# Patient Record
Sex: FEMALE | Race: WHITE | Employment: UNEMPLOYED | URBAN - METROPOLITAN AREA
[De-identification: names, ages, dates, MRNs, and addresses within clinical notes are randomized per-mention and may not be internally consistent; named-entity substitution may affect disease eponyms.]

---

## 2018-01-30 ENCOUNTER — APPOINTMENT (OUTPATIENT)
Dept: GENERAL RADIOLOGY | Facility: CLINIC | Age: 73
End: 2018-01-30
Attending: STUDENT IN AN ORGANIZED HEALTH CARE EDUCATION/TRAINING PROGRAM
Payer: MEDICARE

## 2018-01-30 ENCOUNTER — APPOINTMENT (OUTPATIENT)
Dept: CT IMAGING | Facility: CLINIC | Age: 73
End: 2018-01-30
Attending: STUDENT IN AN ORGANIZED HEALTH CARE EDUCATION/TRAINING PROGRAM
Payer: MEDICARE

## 2018-01-30 ENCOUNTER — HOSPITAL ENCOUNTER (EMERGENCY)
Facility: CLINIC | Age: 73
Discharge: ANOTHER HOSPITAL | End: 2018-01-31
Attending: STUDENT IN AN ORGANIZED HEALTH CARE EDUCATION/TRAINING PROGRAM | Admitting: STUDENT IN AN ORGANIZED HEALTH CARE EDUCATION/TRAINING PROGRAM
Payer: MEDICARE

## 2018-01-30 DIAGNOSIS — A41.9 SEPSIS, DUE TO UNSPECIFIED ORGANISM: ICD-10-CM

## 2018-01-30 DIAGNOSIS — I48.91 ATRIAL FIBRILLATION, UNSPECIFIED TYPE (H): ICD-10-CM

## 2018-01-30 DIAGNOSIS — R41.89 UNRESPONSIVENESS: ICD-10-CM

## 2018-01-30 DIAGNOSIS — J18.9 PNEUMONIA OF RIGHT LOWER LOBE DUE TO INFECTIOUS ORGANISM: ICD-10-CM

## 2018-01-30 DIAGNOSIS — J96.02 ACUTE RESPIRATORY FAILURE WITH HYPERCAPNIA (H): ICD-10-CM

## 2018-01-30 LAB
ALBUMIN SERPL-MCNC: 3.6 G/DL (ref 3.4–5)
ALP SERPL-CCNC: 92 U/L (ref 40–150)
ALT SERPL W P-5'-P-CCNC: 29 U/L (ref 0–50)
ANION GAP SERPL CALCULATED.3IONS-SCNC: 10 MMOL/L (ref 3–14)
APAP SERPL-MCNC: <2 MG/L (ref 10–20)
AST SERPL W P-5'-P-CCNC: 26 U/L (ref 0–45)
BASE DEFICIT BLDV-SCNC: 3.3 MMOL/L
BILIRUB SERPL-MCNC: 0.7 MG/DL (ref 0.2–1.3)
BUN SERPL-MCNC: 12 MG/DL (ref 7–30)
CALCIUM SERPL-MCNC: 8.5 MG/DL (ref 8.5–10.1)
CHLORIDE SERPL-SCNC: 98 MMOL/L (ref 94–109)
CO2 SERPL-SCNC: 27 MMOL/L (ref 20–32)
CREAT SERPL-MCNC: 1.37 MG/DL (ref 0.52–1.04)
ETHANOL SERPL-MCNC: <0.01 G/DL
GFR SERPL CREATININE-BSD FRML MDRD: 38 ML/MIN/1.7M2
GLUCOSE SERPL-MCNC: 212 MG/DL (ref 70–99)
HCO3 BLDV-SCNC: 29 MMOL/L (ref 21–28)
INR PPP: 1.08 (ref 0.86–1.14)
LACTATE BLD-SCNC: 5.9 MMOL/L (ref 0.7–2)
O2/TOTAL GAS SETTING VFR VENT: ABNORMAL %
PCO2 BLDV: 87 MM HG (ref 40–50)
PH BLDV: 7.13 PH (ref 7.32–7.43)
PO2 BLDV: 30 MM HG (ref 25–47)
POTASSIUM SERPL-SCNC: 4.5 MMOL/L (ref 3.4–5.3)
PROT SERPL-MCNC: 7.7 G/DL (ref 6.8–8.8)
SALICYLATES SERPL-MCNC: 2 MG/DL
SODIUM SERPL-SCNC: 135 MMOL/L (ref 133–144)
TROPONIN I SERPL-MCNC: <0.015 UG/L (ref 0–0.04)

## 2018-01-30 PROCEDURE — 80329 ANALGESICS NON-OPIOID 1 OR 2: CPT | Performed by: STUDENT IN AN ORGANIZED HEALTH CARE EDUCATION/TRAINING PROGRAM

## 2018-01-30 PROCEDURE — 25000128 H RX IP 250 OP 636: Performed by: STUDENT IN AN ORGANIZED HEALTH CARE EDUCATION/TRAINING PROGRAM

## 2018-01-30 PROCEDURE — 25000128 H RX IP 250 OP 636: Performed by: EMERGENCY MEDICINE

## 2018-01-30 PROCEDURE — 96375 TX/PRO/DX INJ NEW DRUG ADDON: CPT | Performed by: STUDENT IN AN ORGANIZED HEALTH CARE EDUCATION/TRAINING PROGRAM

## 2018-01-30 PROCEDURE — 99291 CRITICAL CARE FIRST HOUR: CPT | Mod: 25 | Performed by: STUDENT IN AN ORGANIZED HEALTH CARE EDUCATION/TRAINING PROGRAM

## 2018-01-30 PROCEDURE — 25000125 ZZHC RX 250: Performed by: STUDENT IN AN ORGANIZED HEALTH CARE EDUCATION/TRAINING PROGRAM

## 2018-01-30 PROCEDURE — 87040 BLOOD CULTURE FOR BACTERIA: CPT | Performed by: STUDENT IN AN ORGANIZED HEALTH CARE EDUCATION/TRAINING PROGRAM

## 2018-01-30 PROCEDURE — 36415 COLL VENOUS BLD VENIPUNCTURE: CPT | Performed by: STUDENT IN AN ORGANIZED HEALTH CARE EDUCATION/TRAINING PROGRAM

## 2018-01-30 PROCEDURE — 93005 ELECTROCARDIOGRAM TRACING: CPT | Mod: 76 | Performed by: STUDENT IN AN ORGANIZED HEALTH CARE EDUCATION/TRAINING PROGRAM

## 2018-01-30 PROCEDURE — 85610 PROTHROMBIN TIME: CPT | Performed by: STUDENT IN AN ORGANIZED HEALTH CARE EDUCATION/TRAINING PROGRAM

## 2018-01-30 PROCEDURE — 85025 COMPLETE CBC W/AUTO DIFF WBC: CPT | Performed by: STUDENT IN AN ORGANIZED HEALTH CARE EDUCATION/TRAINING PROGRAM

## 2018-01-30 PROCEDURE — 80053 COMPREHEN METABOLIC PANEL: CPT | Performed by: STUDENT IN AN ORGANIZED HEALTH CARE EDUCATION/TRAINING PROGRAM

## 2018-01-30 PROCEDURE — 31500 INSERT EMERGENCY AIRWAY: CPT | Performed by: STUDENT IN AN ORGANIZED HEALTH CARE EDUCATION/TRAINING PROGRAM

## 2018-01-30 PROCEDURE — 80320 DRUG SCREEN QUANTALCOHOLS: CPT | Performed by: STUDENT IN AN ORGANIZED HEALTH CARE EDUCATION/TRAINING PROGRAM

## 2018-01-30 PROCEDURE — 40000986 XR CHEST PORT 1 VW

## 2018-01-30 PROCEDURE — 70450 CT HEAD/BRAIN W/O DYE: CPT

## 2018-01-30 PROCEDURE — 96376 TX/PRO/DX INJ SAME DRUG ADON: CPT | Performed by: STUDENT IN AN ORGANIZED HEALTH CARE EDUCATION/TRAINING PROGRAM

## 2018-01-30 PROCEDURE — 25000125 ZZHC RX 250

## 2018-01-30 PROCEDURE — 84484 ASSAY OF TROPONIN QUANT: CPT | Performed by: STUDENT IN AN ORGANIZED HEALTH CARE EDUCATION/TRAINING PROGRAM

## 2018-01-30 PROCEDURE — 71260 CT THORAX DX C+: CPT

## 2018-01-30 PROCEDURE — 93005 ELECTROCARDIOGRAM TRACING: CPT | Performed by: STUDENT IN AN ORGANIZED HEALTH CARE EDUCATION/TRAINING PROGRAM

## 2018-01-30 PROCEDURE — 31500 INSERT EMERGENCY AIRWAY: CPT | Mod: Z6 | Performed by: STUDENT IN AN ORGANIZED HEALTH CARE EDUCATION/TRAINING PROGRAM

## 2018-01-30 PROCEDURE — 83605 ASSAY OF LACTIC ACID: CPT | Performed by: STUDENT IN AN ORGANIZED HEALTH CARE EDUCATION/TRAINING PROGRAM

## 2018-01-30 PROCEDURE — 99292 CRITICAL CARE ADDL 30 MIN: CPT | Performed by: STUDENT IN AN ORGANIZED HEALTH CARE EDUCATION/TRAINING PROGRAM

## 2018-01-30 PROCEDURE — 96365 THER/PROPH/DIAG IV INF INIT: CPT | Mod: 59 | Performed by: STUDENT IN AN ORGANIZED HEALTH CARE EDUCATION/TRAINING PROGRAM

## 2018-01-30 PROCEDURE — 82803 BLOOD GASES ANY COMBINATION: CPT | Performed by: STUDENT IN AN ORGANIZED HEALTH CARE EDUCATION/TRAINING PROGRAM

## 2018-01-30 PROCEDURE — 96366 THER/PROPH/DIAG IV INF ADDON: CPT | Performed by: STUDENT IN AN ORGANIZED HEALTH CARE EDUCATION/TRAINING PROGRAM

## 2018-01-30 PROCEDURE — 93010 ELECTROCARDIOGRAM REPORT: CPT | Mod: 59 | Performed by: STUDENT IN AN ORGANIZED HEALTH CARE EDUCATION/TRAINING PROGRAM

## 2018-01-30 RX ORDER — ALBUTEROL SULFATE 0.83 MG/ML
SOLUTION RESPIRATORY (INHALATION)
Status: COMPLETED
Start: 2018-01-30 | End: 2018-01-30

## 2018-01-30 RX ORDER — IOPAMIDOL 755 MG/ML
85 INJECTION, SOLUTION INTRAVASCULAR ONCE
Status: COMPLETED | OUTPATIENT
Start: 2018-01-30 | End: 2018-01-30

## 2018-01-30 RX ORDER — METHYLPREDNISOLONE SODIUM SUCCINATE 125 MG/2ML
125 INJECTION, POWDER, LYOPHILIZED, FOR SOLUTION INTRAMUSCULAR; INTRAVENOUS ONCE
Status: COMPLETED | OUTPATIENT
Start: 2018-01-30 | End: 2018-01-31

## 2018-01-30 RX ORDER — SODIUM CHLORIDE, SODIUM LACTATE, POTASSIUM CHLORIDE, CALCIUM CHLORIDE 600; 310; 30; 20 MG/100ML; MG/100ML; MG/100ML; MG/100ML
INJECTION, SOLUTION INTRAVENOUS CONTINUOUS
Status: DISCONTINUED | OUTPATIENT
Start: 2018-01-30 | End: 2018-01-31 | Stop reason: HOSPADM

## 2018-01-30 RX ORDER — LIDOCAINE 40 MG/G
CREAM TOPICAL
Status: DISCONTINUED | OUTPATIENT
Start: 2018-01-30 | End: 2018-01-31 | Stop reason: HOSPADM

## 2018-01-30 RX ADMIN — SODIUM CHLORIDE 100 ML: 9 INJECTION, SOLUTION INTRAVENOUS at 23:50

## 2018-01-30 RX ADMIN — SODIUM CHLORIDE 1000 ML: 9 INJECTION, SOLUTION INTRAVENOUS at 23:15

## 2018-01-30 RX ADMIN — ALBUTEROL SULFATE 2.5 MG: 2.5 SOLUTION RESPIRATORY (INHALATION) at 23:24

## 2018-01-30 RX ADMIN — SODIUM CHLORIDE 1000 ML: 9 INJECTION, SOLUTION INTRAVENOUS at 22:50

## 2018-01-30 RX ADMIN — MIDAZOLAM HYDROCHLORIDE 2 MG/HR: 5 INJECTION, SOLUTION INTRAMUSCULAR; INTRAVENOUS at 23:15

## 2018-01-30 RX ADMIN — IOPAMIDOL 85 ML: 755 INJECTION, SOLUTION INTRAVENOUS at 23:49

## 2018-01-31 VITALS
SYSTOLIC BLOOD PRESSURE: 134 MMHG | DIASTOLIC BLOOD PRESSURE: 65 MMHG | OXYGEN SATURATION: 97 % | TEMPERATURE: 96.5 F | RESPIRATION RATE: 20 BRPM

## 2018-01-31 LAB
ALBUMIN UR-MCNC: 10 MG/DL
AMPHETAMINES UR QL SCN: NEGATIVE
APPEARANCE UR: CLEAR
BARBITURATES UR QL: NEGATIVE
BASE DEFICIT BLDV-SCNC: 0.6 MMOL/L
BASOPHILS # BLD AUTO: 0 10E9/L (ref 0–0.2)
BASOPHILS NFR BLD AUTO: 0 %
BENZODIAZ UR QL: POSITIVE
BILIRUB UR QL STRIP: NEGATIVE
CANNABINOIDS UR QL SCN: NEGATIVE
COCAINE UR QL: NEGATIVE
COLOR UR AUTO: ABNORMAL
DIFFERENTIAL METHOD BLD: ABNORMAL
EOSINOPHIL # BLD AUTO: 0.3 10E9/L (ref 0–0.7)
EOSINOPHIL NFR BLD AUTO: 2 %
ERYTHROCYTE [DISTWIDTH] IN BLOOD BY AUTOMATED COUNT: 12.6 % (ref 10–15)
FLUAV+FLUBV AG SPEC QL: NEGATIVE
FLUAV+FLUBV AG SPEC QL: NEGATIVE
GLUCOSE UR STRIP-MCNC: 70 MG/DL
HCO3 BLDV-SCNC: 29 MMOL/L (ref 21–28)
HCT VFR BLD AUTO: 50.8 % (ref 35–47)
HGB BLD-MCNC: 16.3 G/DL (ref 11.7–15.7)
HGB UR QL STRIP: NEGATIVE
KETONES UR STRIP-MCNC: NEGATIVE MG/DL
LACTATE BLD-SCNC: 2.4 MMOL/L (ref 0.7–2)
LEUKOCYTE ESTERASE UR QL STRIP: NEGATIVE
LYMPHOCYTES # BLD AUTO: 5 10E9/L (ref 0.8–5.3)
LYMPHOCYTES NFR BLD AUTO: 40 %
MCH RBC QN AUTO: 31.2 PG (ref 26.5–33)
MCHC RBC AUTO-ENTMCNC: 32.1 G/DL (ref 31.5–36.5)
MCV RBC AUTO: 97 FL (ref 78–100)
MONOCYTES # BLD AUTO: 0.6 10E9/L (ref 0–1.3)
MONOCYTES NFR BLD AUTO: 5 %
NEUTROPHILS # BLD AUTO: 6.7 10E9/L (ref 1.6–8.3)
NEUTROPHILS NFR BLD AUTO: 53 %
NITRATE UR QL: NEGATIVE
OPIATES UR QL SCN: NEGATIVE
PCO2 BLDV: 69 MM HG (ref 40–50)
PCP UR QL SCN: NEGATIVE
PH BLDV: 7.23 PH (ref 7.32–7.43)
PH UR STRIP: 6.5 PH (ref 5–7)
PLATELET # BLD AUTO: 193 10E9/L (ref 150–450)
PO2 BLDV: 22 MM HG (ref 25–47)
RBC # BLD AUTO: 5.22 10E12/L (ref 3.8–5.2)
RBC #/AREA URNS AUTO: 1 /HPF (ref 0–2)
SOURCE: ABNORMAL
SP GR UR STRIP: 1.01 (ref 1–1.03)
SPECIMEN SOURCE: NORMAL
UROBILINOGEN UR STRIP-MCNC: NORMAL MG/DL (ref 0–2)
WBC # BLD AUTO: 12.6 10E9/L (ref 4–11)
WBC #/AREA URNS AUTO: <1 /HPF (ref 0–2)

## 2018-01-31 PROCEDURE — 96367 TX/PROPH/DG ADDL SEQ IV INF: CPT | Performed by: STUDENT IN AN ORGANIZED HEALTH CARE EDUCATION/TRAINING PROGRAM

## 2018-01-31 PROCEDURE — 40000275 ZZH STATISTIC RCP TIME EA 10 MIN

## 2018-01-31 PROCEDURE — 96365 THER/PROPH/DIAG IV INF INIT: CPT | Mod: 59 | Performed by: STUDENT IN AN ORGANIZED HEALTH CARE EDUCATION/TRAINING PROGRAM

## 2018-01-31 PROCEDURE — 81001 URINALYSIS AUTO W/SCOPE: CPT | Mod: XU | Performed by: STUDENT IN AN ORGANIZED HEALTH CARE EDUCATION/TRAINING PROGRAM

## 2018-01-31 PROCEDURE — 40000281 ZZH STATISTIC TRANSPORT TIME EA 15 MIN

## 2018-01-31 PROCEDURE — 96366 THER/PROPH/DIAG IV INF ADDON: CPT | Performed by: STUDENT IN AN ORGANIZED HEALTH CARE EDUCATION/TRAINING PROGRAM

## 2018-01-31 PROCEDURE — 82803 BLOOD GASES ANY COMBINATION: CPT | Performed by: STUDENT IN AN ORGANIZED HEALTH CARE EDUCATION/TRAINING PROGRAM

## 2018-01-31 PROCEDURE — 87086 URINE CULTURE/COLONY COUNT: CPT | Performed by: STUDENT IN AN ORGANIZED HEALTH CARE EDUCATION/TRAINING PROGRAM

## 2018-01-31 PROCEDURE — 87804 INFLUENZA ASSAY W/OPTIC: CPT | Performed by: STUDENT IN AN ORGANIZED HEALTH CARE EDUCATION/TRAINING PROGRAM

## 2018-01-31 PROCEDURE — 25000128 H RX IP 250 OP 636: Performed by: STUDENT IN AN ORGANIZED HEALTH CARE EDUCATION/TRAINING PROGRAM

## 2018-01-31 PROCEDURE — 80307 DRUG TEST PRSMV CHEM ANLYZR: CPT | Performed by: STUDENT IN AN ORGANIZED HEALTH CARE EDUCATION/TRAINING PROGRAM

## 2018-01-31 PROCEDURE — 40000276 ZZH STATISTIC RCP TIME ED VENT EA 10 MIN

## 2018-01-31 PROCEDURE — 25000125 ZZHC RX 250

## 2018-01-31 PROCEDURE — 83605 ASSAY OF LACTIC ACID: CPT | Performed by: STUDENT IN AN ORGANIZED HEALTH CARE EDUCATION/TRAINING PROGRAM

## 2018-01-31 RX ORDER — FENTANYL CITRATE 50 UG/ML
50 INJECTION, SOLUTION INTRAMUSCULAR; INTRAVENOUS ONCE
Status: COMPLETED | OUTPATIENT
Start: 2018-01-31 | End: 2018-01-31

## 2018-01-31 RX ORDER — IPRATROPIUM BROMIDE AND ALBUTEROL SULFATE 2.5; .5 MG/3ML; MG/3ML
SOLUTION RESPIRATORY (INHALATION)
Status: COMPLETED
Start: 2018-01-31 | End: 2018-01-31

## 2018-01-31 RX ADMIN — SODIUM CHLORIDE 1000 ML: 9 INJECTION, SOLUTION INTRAVENOUS at 01:20

## 2018-01-31 RX ADMIN — AZITHROMYCIN MONOHYDRATE 500 MG: 500 INJECTION, POWDER, LYOPHILIZED, FOR SOLUTION INTRAVENOUS at 00:11

## 2018-01-31 RX ADMIN — FENTANYL CITRATE 50 MCG: 50 INJECTION, SOLUTION INTRAMUSCULAR; INTRAVENOUS at 00:26

## 2018-01-31 RX ADMIN — TAZOBACTAM SODIUM AND PIPERACILLIN SODIUM 4.5 G: 500; 4 INJECTION, SOLUTION INTRAVENOUS at 01:25

## 2018-01-31 RX ADMIN — METHYLPREDNISOLONE SODIUM SUCCINATE 125 MG: 125 INJECTION, POWDER, FOR SOLUTION INTRAMUSCULAR; INTRAVENOUS at 00:10

## 2018-01-31 RX ADMIN — FENTANYL CITRATE 50 MCG: 50 INJECTION, SOLUTION INTRAMUSCULAR; INTRAVENOUS at 02:11

## 2018-01-31 RX ADMIN — IPRATROPIUM BROMIDE AND ALBUTEROL SULFATE: .5; 3 SOLUTION RESPIRATORY (INHALATION) at 00:30

## 2018-01-31 NOTE — PROGRESS NOTES
Adjusted vent post VBG to Ac-24, 450, 60%, +5.  Patient appears to be ventilating/ jay well. Will cont to  Monitor while awaiting transport.

## 2018-01-31 NOTE — ED PROVIDER NOTES
"  History     Chief Complaint   Patient presents with     Altered Mental Status     HPI  Barbara Nelson is a 72 year old female with past medical history which includes depression, hypertension, and alcohol use who presents unresponsive via EMS.  According to EMS report, they received a call that patient became acutely unresponsive and having difficulty breathing.  Upon their arrival the patient's significant other informed them that he left the room for around 10 minutes and upon returning found her unresponsive.  EMS was also informed that the patient has been \"feeling ill for the last couple of days\" but without any further specifics.  Apparently family is on their way to the department.      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 Provider to review       Depressive disorder, not elsewhere classified 11/23/2005     Priority: Medium     Female stress incontinence 11/23/2005     Priority: Medium     Anxiety state 11/23/2005     Priority: Medium     Problem list name updated by automated process. Provider to review          Past Medical History:    Past Medical History:   Diagnosis Date     Depressive disorder, not elsewhere classified      Female stress incontinence      [facility] endocrine NEC      Pure hypercholesterolemia        Past Surgical History:    Past Surgical History:   Procedure Laterality Date     ARTHROSCOPY SHOULDER RT/LT  1993    left     COLONOSCOPY  1/2005     SURGICAL HISTORY OF -   age 8    Tonsils     SURGICAL HISTORY OF -   1980's     Breast Bx      SURGICAL HISTORY OF -   1995    Hysterectomy, Bilateral Oophorectomy       Family History:    Family History   Problem Relation Age of Onset     Depression Mother      CANCER Maternal Grandmother "      CANCER Maternal Grandfather      CANCER Brother        Social History:  Marital Status:   [2]  Social History   Substance Use Topics     Smoking status: Former Smoker     Packs/day: 0.50     Types: Cigarettes     Quit date: 2/4/2006     Smokeless tobacco: Not on file     Alcohol use Yes      Comment: occasionally        Medications:      oxyCODONE-acetaminophen (PERCOCET) 5-325 MG per tablet   citalopram (CELEXA) 20 MG tablet   lovastatin (MEVACOR) 20 MG tablet   Multiple Vitamin (MULTIVITAMIN OR)   VITAMIN C 500 MG OR TABS   CITRACAL OR   ASPIRIN CAPS 81 MG OR   ADVIL 100 MG OR TABS         Review of Systems  Unable to obtain secondary to patient clinical condition...    Physical Exam   BP: (!) 210/119  Heart Rate: 105  Temp: 96.5  F (35.8  C)  Resp: 20  SpO2: 97 %      Physical Exam  Constitutional:  Well developed, well nourished.  Unresponsive and does not react to physical stimulus.  HENT:  Normocephalic and atraumatic.  Symmetric in appearance.  Eyes:  Conjunctivae are normal.  PERRLA.  Neck:  Neck supple.  Cardiovascular:  No cyanosis.  Mild tachycardia with regular rhythm.  No audible murmurs noted.  No lower extremity edema or asymmetry.  Respiratory: Nonrebreather mask in place, spontaneously breathing but not protecting her airway.  Moderate diffuse wheezing with crackles.   Gastrointestinal:  Soft, nondistended abdomen.  Nontender and without guarding.  No rigidity or rebound tenderness.  Negative Miller's sign.  Negative McBurney's point.    Musculoskeletal: No obvious signs of injury.  Neurological:  Patient is unresponsive.  Skin:  Skin is warm and dry.      ED Course     ED Course     Procedures                 EKG Interpretation:      Interpreted by: Tomi Joseph  Time reviewed: Upon arrival     Symptoms at time of EKG: Unresponsive  Rhythm: Sinus  Rate: Normal  Conduction: None atypical   ST Segments/ T Waves: No pathologic ST-elevations or T-wave abnormalities.  Q Waves:  Anterior  Comparison to prior: New anterior Q-waves since 2011    Clinical Impression: No sign of ischemia         Critical Care time:  was 45 minutes for this patient excluding procedures.  Critical Care Addendum    My initial assessment, based on my review of nursing observations, review of vital signs, focused history, physical exam, 12 lead ECG analysis and discussion with family, established that Barbara Nelson has altered mental status, respiratory failure and suspicion for sepsis and need for evaluation and early goal-directed therapy, which requires immediate intervention, and therefore She is critically ill.     After the initial assessment, the care team initiated multiple lab tests, initiated IV fluid administration, initiated medication therapy with fluid and antibiotics and performed advanced airway management to provide stabilization care. Due to the critical nature of this patient, I reassessed nursing observations, vital signs, physical exam, review of cardiac rhythm monitor, mental status and respiratory status multiple times prior to She disposition.     Time also spent performing documentation, discussion with family to obtain medical information for decision making, reviewing test results, discussion with consultants and coordination of care.     Critical care time (excluding teaching time and procedures): 45 minutes.     The patient has signs of Severe Sepsis as evidenced by:    1. 2 SIRS criteria, AND  2. Suspected infection, AND   3. Organ dysfunction: Lactic Acid >2    Time severe sepsis diagnosis confirmed =  as this was the time when Lactate resulted, and the level was >2 and Acute Resp Failure requiring BiPAP or Mechanical Vent      3 Hour Severe Sepsis Bundle Completion:  1. Initial Lactic Acid Result:   Recent Labs   Lab Test  01/31/18   0019  01/30/18   2247   LACT  2.4*  5.9*     2. Blood Cultures before Antibiotics: Yes  3. Broad Spectrum Antibiotics Administered:  Yes     Anti-infectives (Future)    Start     Dose/Rate Route Frequency Ordered Stop    01/31/18 2300  azithromycin (ZITHROMAX) 250 mg in NaCl 0.9 % 250 mL intermittent infusion      250 mg  over 1 Hours Intravenous EVERY 24 HOURS 01/30/18 2349 02/04/18 2259    01/30/18 2350  piperacillin-tazobactam (ZOSYN) intermittent infusion 4.5 g      4.5 g  200 mL/hr over 30 Minutes Intravenous EVERY 6 HOURS 01/30/18 2349          4. 3000 ml of IV fluids.    the patient was transferred out of the ED prior to the 6 hour hadley.         [facility] Procedure Note     Procedure:  Rapid Sequence Intubation   Performed by: Tomi Joseph    Procedure: Rapid Sequence Endotracheal Intubation    Indication: Severe respiratory distress with impending failure    Consent: Emergent    A time-out was completed verifying correct patient, procedure, positioning, and all necessary equipment. The patient was placed in correct positioning. Non-rebreather mask was placed as well as nasal cannula for apneic oxygenation during intubation. Sedation was obtained using 30 mg of Etomidate followed by muscular paralysis using 100 mg of rocuronium. The patient was easily ventilated using an ambu bag. A Video Laryngoscope was used for the procedure. There was visualization of the 7.5-Urdu endotracheal tube going through the vocal cords. The stylette was removed. Capnography was used to confirm placement. Breath sounds were heard in both lung fields equally. The endotracheal tube was placed at 23 cm, measured at the lip line. The patient was on monitor through the entire procedure, there were no significant delays or episodes of hypoxia.    A chest x-ray was ordered to assess for pneumothorax and verify endotracheal tube placement.    The patient tolerated the procedure well and there were no complications.             Results for orders placed or performed during the hospital encounter of 01/30/18 (from the past 24 hour(s))   INR    Result Value Ref Range    INR 1.08 0.86 - 1.14   Salicylate level   Result Value Ref Range    Salicylate Level 2 mg/dL   Acetaminophen level   Result Value Ref Range    Acetaminophen Level <2 mg/L   Alcohol ethyl   Result Value Ref Range    Ethanol g/dL <0.01 <0.01 g/dL   CBC with platelets differential   Result Value Ref Range    WBC 12.6 (H) 4.0 - 11.0 10e9/L    RBC Count 5.22 (H) 3.8 - 5.2 10e12/L    Hemoglobin 16.3 (H) 11.7 - 15.7 g/dL    Hematocrit 50.8 (H) 35.0 - 47.0 %    MCV 97 78 - 100 fl    MCH 31.2 26.5 - 33.0 pg    MCHC 32.1 31.5 - 36.5 g/dL    RDW 12.6 10.0 - 15.0 %    Platelet Count 193 150 - 450 10e9/L    Diff Method Manual Differential     % Neutrophils 53.0 %    % Lymphocytes 40.0 %    % Monocytes 5.0 %    % Eosinophils 2.0 %    % Basophils 0.0 %    Absolute Neutrophil 6.7 1.6 - 8.3 10e9/L    Absolute Lymphocytes 5.0 0.8 - 5.3 10e9/L    Absolute Monocytes 0.6 0.0 - 1.3 10e9/L    Absolute Eosinophils 0.3 0.0 - 0.7 10e9/L    Absolute Basophils 0.0 0.0 - 0.2 10e9/L   Comprehensive metabolic panel   Result Value Ref Range    Sodium 135 133 - 144 mmol/L    Potassium 4.5 3.4 - 5.3 mmol/L    Chloride 98 94 - 109 mmol/L    Carbon Dioxide 27 20 - 32 mmol/L    Anion Gap 10 3 - 14 mmol/L    Glucose 212 (H) 70 - 99 mg/dL    Urea Nitrogen 12 7 - 30 mg/dL    Creatinine 1.37 (H) 0.52 - 1.04 mg/dL    GFR Estimate 38 (L) >60 mL/min/1.7m2    GFR Estimate If Black 46 (L) >60 mL/min/1.7m2    Calcium 8.5 8.5 - 10.1 mg/dL    Bilirubin Total 0.7 0.2 - 1.3 mg/dL    Albumin 3.6 3.4 - 5.0 g/dL    Protein Total 7.7 6.8 - 8.8 g/dL    Alkaline Phosphatase 92 40 - 150 U/L    ALT 29 0 - 50 U/L    AST 26 0 - 45 U/L   Lactic acid whole blood   Result Value Ref Range    Lactic Acid 5.9 (HH) 0.7 - 2.0 mmol/L   Troponin I   Result Value Ref Range    Troponin I ES <0.015 0.000 - 0.045 ug/L   Head CT w/o contrast    Narrative    CT SCAN OF THE HEAD WITHOUT CONTRAST   1/30/2018 11:09 PM     HISTORY: Acute unresponsive episode.      TECHNIQUE:  Axial images of the head and coronal reformations without  IV contrast material. Radiation dose for this scan was reduced using  automated exposure control, adjustment of the mA and/or kV according  to patient size, or iterative reconstruction technique.    COMPARISON: None.    FINDINGS:  There is generalized atrophy of the brain.  There is low  attenuation in the white matter of the cerebral hemispheres consistent  with sequelae of small vessel ischemic disease. There is no evidence  of intracranial hemorrhage, mass, acute infarct or anomaly. Tiny  calcification in the right occipital lobe.    The visualized portions of the sinuses and mastoids appear normal.  There is no evidence of trauma.      Impression    IMPRESSION: No acute abnormality.     Blood gas venous   Result Value Ref Range    Ph Venous 7.13 (LL) 7.32 - 7.43 pH    PCO2 Venous 87 (HH) 40 - 50 mm Hg    PO2 Venous 30 25 - 47 mm Hg    Bicarbonate Venous 29 (H) 21 - 28 mmol/L    Base Deficit Venous 3.3 mmol/L    FIO2 Midstream Urine    Chest  XR, 1 view portable    Narrative    XR CHEST PORT 1 VIEW   1/30/2018 11:28 PM     HISTORY: Found unresponsive, ET tube placement.     COMPARISON: 8/6/2011.    FINDINGS: Supine portable chest. An ET tube is in place with tip  approximately 5.5 cm above the al. No pneumothorax. The heart size  is normal. Thoracic aorta is calcified and mildly tortuous. There is  an infiltrate in the right mid and lower lung. Old right fifth rib  resection. The left lung is clear allowing for supine positioning.      Impression    IMPRESSION:  1. ET tube in the mid trachea.  2. Right lung infiltrate.     Chest CT - IV contrast only - PE protocol    Narrative    CT CHEST PULMONARY EMBOLISM WITH CONTRAST  1/31/2018 12:03 AM    HISTORY:  Acute dyspnea, recent URI symptoms.     TECHNIQUE: Scans obtained from the apices through the diaphragm with  IV contrast. 85 mL Isovue-370 injected. Radiation dose for this scan  was  reduced using automated exposure control, adjustment of the mA  and/or kV according to patient size, or iterative reconstruction  technique.    COMPARISON:  12/3/2005.    FINDINGS:  Evaluation of the pulmonary arterial system shows no  evidence of embolus. There is no aortic aneurysm or dissection. There  are coronary artery atherosclerotic calcifications. The heart is at  the upper limits of normal in size. There is a large right lower lobe  consolidation consistent with pneumonia. There is also dependent  atelectasis in the lungs bilaterally. Prominent interstitial pattern  throughout the lungs is likely pulmonary edema. There is a very small  right pleural effusion. Dependent atelectasis at the left lung base.  There are a few enlarged right hilar and mediastinal lymph nodes. A  precarinal node measures 1.7 x 1.1 cm. There is an ET tube in the mid  trachea. Images through the upper abdomen show no acute abnormalities.  There is degenerative disease in the spine.      Impression    IMPRESSION:  1. There is no pulmonary embolus, aortic aneurysm or dissection.  2. Large right lower lobe pneumonia.  3. Interstitial pulmonary edema.  4. Several enlarged right hilar and mediastinal lymph nodes which may  be reactive.   6. Coronary artery atherosclerotic calcifications. The heart is at the  upper limits of normal in size.    Blood gas venous   Result Value Ref Range    Ph Venous 7.23 (L) 7.32 - 7.43 pH    PCO2 Venous 69 (H) 40 - 50 mm Hg    PO2 Venous 22 (L) 25 - 47 mm Hg    Bicarbonate Venous 29 (H) 21 - 28 mmol/L    Base Deficit Venous 0.6 mmol/L   Lactic acid whole blood   Result Value Ref Range    Lactic Acid 2.4 (H) 0.7 - 2.0 mmol/L   UA with Microscopic   Result Value Ref Range    Color Urine Straw     Appearance Urine Clear     Glucose Urine 70 (A) NEG^Negative mg/dL    Bilirubin Urine Negative NEG^Negative    Ketones Urine Negative NEG^Negative mg/dL    Specific Gravity Urine 1.008 1.003 - 1.035    Blood Urine  Negative NEG^Negative    pH Urine 6.5 5.0 - 7.0 pH    Protein Albumin Urine 10 (A) NEG^Negative mg/dL    Urobilinogen mg/dL Normal 0.0 - 2.0 mg/dL    Nitrite Urine Negative NEG^Negative    Leukocyte Esterase Urine Negative NEG^Negative    Source Midstream Urine     WBC Urine <1 0 - 2 /HPF    RBC Urine 1 0 - 2 /HPF   Drug abuse screen 77 urine ([place],SH)   Result Value Ref Range    Amphetamine Qual Urine Negative NEG^Negative    Barbiturates Qual Urine Negative NEG^Negative    Benzodiazepine Qual Urine Positive (A) NEG^Negative    Cannabinoids Qual Urine Negative NEG^Negative    Cocaine Qual Urine Negative NEG^Negative    Opiates Qualitative Urine Negative NEG^Negative    PCP Qual Urine Negative NEG^Negative   Influenza A/B antigen   Result Value Ref Range    Influenza A/B Agn Specimen Nasal     Influenza A Negative NEG^Negative    Influenza B Negative NEG^Negative       11:27 PM  Family arrived in the department, significant other relays the same information that EMS provided in that the patient has suffered from mild cough and respiratory infectious symptoms for the past couple of days but no report of fevers, chest pain, or gastrointestinal symptoms.  They lie down for bed this evening and patient acutely complained of shortness of breath before becoming unresponsive.        Assessments & Plan (with Medical Decision Making)   Barbara Nelson is a 72 year old female who presented to the department unresponsive by EMS.  She required a nonrebreather oxygen mask and without gag reflex or protection of her airway.  2 large-bore IVs were obtained and I will access removed.  She was prepared for rapid sequence intubation, ET tube placed upon first attempt and without complication.  Given EMS report of shortness of breath and abrupt change in mental status, she was taken directly to the CT scanner for rule out of intracranial hemorrhage, fortunately no acute pathology upon CT scan of head/brain.    Family eventually  arrived to the department and provided further information such as her respiratory infectious symptoms of the past 2-3 days.  She has been without chest pain or shortness of breath until late in the evening when she abruptly complained of shortness of breath and rapidly declined.  A CT pulmonary angiogram was performed to rule out pulmonary embolism and confirms a large right-sided infiltrate consistent with pneumonia.  Initial lactate of 5.6 is concerning but after nearly 3 L of IV fluid, repeat lactate of 2.4 and VBG with improved pH.  She received IV antibiotics Zosyn and azithromycin.  Patient remained hypertensive and did not require central venous access or vasopressor therapy.    The patient will require admission for management of her airway as well as septic process secondary to pneumonia.  Unfortunately we do not have bed availability at our facility, family requested transport [facility] where patient has received care in the past.  Consulted Dr. Matthews regarding patient presentation and workup thus far, agrees to accept patient as transfer, no further recommendations.    The patient's family has been informed of her results and the recommendation for transfer.  They have verbalized an understanding, all questions answered, and they are in agreement with the plan at this time.      Disclaimer:  This note consists of symbols derived from keyboarding, dictation, and/or voice recognition software.  As a result, there may be errors in the script that have gone undetected.  Please consider this when interpreting information found in the chart.        I have reviewed the nursing notes.    I have reviewed the findings, diagnosis, plan and need for follow up with the patient.       Discharge Medication List as of 1/31/2018  2:19 AM          Final diagnoses:   Pneumonia of right lower lobe due to infectious organism (H)   Sepsis, due to unspecified organism (H)   Acute respiratory failure with hypercapnia (H)    Unresponsiveness   Atrial fibrillation, unspecified type (H)       1/30/2018   [facility] EMERGENCY DEPARTMENT     Tomi Joseph DO  01/31/18 0337

## 2018-01-31 NOTE — ED NOTES
DATE:  1/30/2018   TIME OF RECEIPT FROM LAB:  4587  PH 7.13  PCO2 87  RESULTS GIVEN WITH READ-BACK TO (PROVIDER):  Tomi Joseph V, DO  TIME LAB VALUE REPORTED TO PROVIDER:   8578

## 2018-01-31 NOTE — ED NOTES
Pt brought to ED by EMS with concerns of altered mental status. Pt has had an upper respiratory infection for about 3 days. Pt took some nyquil while getting ready for bed at about 9:30 pm tonight.  notes that she was fine while getting ready for bed, then suddenly reported feeling short of breath.  helped get her adjusted better in bed and then pt asked him to call 911. Upon EMS arrival pt was no longer verbally responsive. Pt was hypertensive for EMS. Upon arrival here pt was responsive to pain, not following commands. Of note pt has a 100 year pack smoking history.

## 2018-02-01 LAB
BACTERIA SPEC CULT: NO GROWTH
Lab: NORMAL
SPECIMEN SOURCE: NORMAL

## 2018-02-06 LAB
BACTERIA SPEC CULT: NO GROWTH
BACTERIA SPEC CULT: NO GROWTH
Lab: NORMAL
Lab: NORMAL
SPECIMEN SOURCE: NORMAL
SPECIMEN SOURCE: NORMAL

## 2021-01-01 ENCOUNTER — PATIENT OUTREACH (OUTPATIENT)
Dept: GASTROENTEROLOGY | Facility: CLINIC | Age: 76
End: 2021-01-01

## 2021-01-01 ENCOUNTER — ANESTHESIA (OUTPATIENT)
Dept: SURGERY | Facility: CLINIC | Age: 76
End: 2021-01-01
Payer: COMMERCIAL

## 2021-01-01 ENCOUNTER — IMMUNIZATION (OUTPATIENT)
Dept: FAMILY MEDICINE | Facility: CLINIC | Age: 76
End: 2021-01-01
Payer: COMMERCIAL

## 2021-01-01 ENCOUNTER — APPOINTMENT (OUTPATIENT)
Dept: ULTRASOUND IMAGING | Facility: CLINIC | Age: 76
DRG: 260 | End: 2021-01-01
Attending: PHYSICIAN ASSISTANT
Payer: COMMERCIAL

## 2021-01-01 ENCOUNTER — APPOINTMENT (OUTPATIENT)
Dept: NEUROLOGY | Facility: CLINIC | Age: 76
DRG: 260 | End: 2021-01-01
Attending: INTERNAL MEDICINE
Payer: COMMERCIAL

## 2021-01-01 ENCOUNTER — TELEPHONE (OUTPATIENT)
Dept: GASTROENTEROLOGY | Facility: CLINIC | Age: 76
End: 2021-01-01

## 2021-01-01 ENCOUNTER — APPOINTMENT (OUTPATIENT)
Dept: GENERAL RADIOLOGY | Facility: CLINIC | Age: 76
End: 2021-01-01
Attending: INTERNAL MEDICINE
Payer: COMMERCIAL

## 2021-01-01 ENCOUNTER — HOSPITAL ENCOUNTER (INPATIENT)
Facility: CLINIC | Age: 76
LOS: 4 days | DRG: 260 | End: 2022-01-03
Attending: INTERNAL MEDICINE | Admitting: INTERNAL MEDICINE
Payer: COMMERCIAL

## 2021-01-01 ENCOUNTER — APPOINTMENT (OUTPATIENT)
Dept: CT IMAGING | Facility: CLINIC | Age: 76
DRG: 260 | End: 2021-01-01
Attending: INTERNAL MEDICINE
Payer: COMMERCIAL

## 2021-01-01 ENCOUNTER — APPOINTMENT (OUTPATIENT)
Dept: CT IMAGING | Facility: CLINIC | Age: 76
DRG: 260 | End: 2021-01-01
Attending: PHYSICIAN ASSISTANT
Payer: COMMERCIAL

## 2021-01-01 ENCOUNTER — APPOINTMENT (OUTPATIENT)
Dept: GENERAL RADIOLOGY | Facility: CLINIC | Age: 76
DRG: 260 | End: 2021-01-01
Attending: INTERNAL MEDICINE
Payer: COMMERCIAL

## 2021-01-01 ENCOUNTER — HOSPITAL ENCOUNTER (EMERGENCY)
Facility: CLINIC | Age: 76
Discharge: ANOTHER HOSPITAL | End: 2021-12-30
Attending: EMERGENCY MEDICINE | Admitting: EMERGENCY MEDICINE
Payer: COMMERCIAL

## 2021-01-01 ENCOUNTER — APPOINTMENT (OUTPATIENT)
Dept: CARDIOLOGY | Facility: CLINIC | Age: 76
DRG: 260 | End: 2021-01-01
Attending: INTERNAL MEDICINE
Payer: COMMERCIAL

## 2021-01-01 ENCOUNTER — APPOINTMENT (OUTPATIENT)
Dept: GENERAL RADIOLOGY | Facility: CLINIC | Age: 76
End: 2021-01-01
Attending: EMERGENCY MEDICINE
Payer: COMMERCIAL

## 2021-01-01 ENCOUNTER — ANESTHESIA EVENT (OUTPATIENT)
Dept: SURGERY | Facility: CLINIC | Age: 76
End: 2021-01-01
Payer: COMMERCIAL

## 2021-01-01 ENCOUNTER — PREP FOR PROCEDURE (OUTPATIENT)
Dept: GASTROENTEROLOGY | Facility: CLINIC | Age: 76
End: 2021-01-01

## 2021-01-01 ENCOUNTER — HOSPITAL ENCOUNTER (OUTPATIENT)
Facility: CLINIC | Age: 76
Discharge: HOME | End: 2021-04-15
Attending: INTERNAL MEDICINE | Admitting: INTERNAL MEDICINE
Payer: COMMERCIAL

## 2021-01-01 VITALS
HEART RATE: 70 BPM | BODY MASS INDEX: 31.71 KG/M2 | DIASTOLIC BLOOD PRESSURE: 60 MMHG | HEIGHT: 68 IN | RESPIRATION RATE: 18 BRPM | WEIGHT: 209.22 LBS | SYSTOLIC BLOOD PRESSURE: 144 MMHG | OXYGEN SATURATION: 95 % | TEMPERATURE: 97.6 F

## 2021-01-01 VITALS
TEMPERATURE: 98.3 F | HEIGHT: 68 IN | BODY MASS INDEX: 33.78 KG/M2 | RESPIRATION RATE: 21 BRPM | HEART RATE: 34 BPM | DIASTOLIC BLOOD PRESSURE: 49 MMHG | WEIGHT: 222.88 LBS | SYSTOLIC BLOOD PRESSURE: 123 MMHG | OXYGEN SATURATION: 99 %

## 2021-01-01 DIAGNOSIS — I46.9 CARDIAC ARREST (H): ICD-10-CM

## 2021-01-01 DIAGNOSIS — G93.40 ENCEPHALOPATHY: ICD-10-CM

## 2021-01-01 DIAGNOSIS — K80.50 CHOLEDOCHOLITHIASIS: ICD-10-CM

## 2021-01-01 DIAGNOSIS — I46.9 CARDIAC ARREST (H): Primary | ICD-10-CM

## 2021-01-01 DIAGNOSIS — Z23 HIGH PRIORITY FOR 2019-NCOV VACCINE: Primary | ICD-10-CM

## 2021-01-01 DIAGNOSIS — K80.50 CHOLEDOCHOLITHIASIS: Primary | ICD-10-CM

## 2021-01-01 LAB
ALBUMIN SERPL-MCNC: 2.4 G/DL (ref 3.4–5)
ALBUMIN SERPL-MCNC: 2.7 G/DL (ref 3.4–5)
ALBUMIN SERPL-MCNC: 2.8 G/DL (ref 3.4–5)
ALBUMIN SERPL-MCNC: 3.5 G/DL (ref 3.4–5)
ALBUMIN UR-MCNC: 100 MG/DL
ALBUMIN UR-MCNC: >499 MG/DL
ALP SERPL-CCNC: 160 U/L (ref 40–150)
ALP SERPL-CCNC: 83 U/L (ref 40–150)
ALP SERPL-CCNC: 85 U/L (ref 40–150)
ALP SERPL-CCNC: 86 U/L (ref 40–150)
ALP SERPL-CCNC: 89 U/L (ref 40–150)
ALP SERPL-CCNC: 92 U/L (ref 40–150)
ALT SERPL W P-5'-P-CCNC: 29 U/L (ref 0–50)
ALT SERPL W P-5'-P-CCNC: 31 U/L (ref 0–50)
ALT SERPL W P-5'-P-CCNC: 31 U/L (ref 0–50)
ALT SERPL W P-5'-P-CCNC: 32 U/L (ref 0–50)
ALT SERPL W P-5'-P-CCNC: 35 U/L (ref 0–50)
ALT SERPL W P-5'-P-CCNC: 403 U/L (ref 0–50)
AMPHETAMINES UR QL SCN: NORMAL
AMYLASE SERPL-CCNC: 34 U/L (ref 30–110)
ANION GAP SERPL CALCULATED.3IONS-SCNC: 12 MMOL/L (ref 3–14)
ANION GAP SERPL CALCULATED.3IONS-SCNC: 16 MMOL/L (ref 3–14)
ANION GAP SERPL CALCULATED.3IONS-SCNC: 8 MMOL/L (ref 3–14)
ANION GAP SERPL CALCULATED.3IONS-SCNC: 8 MMOL/L (ref 3–14)
ANION GAP SERPL CALCULATED.3IONS-SCNC: 9 MMOL/L (ref 3–14)
ANION GAP SERPL CALCULATED.3IONS-SCNC: 9 MMOL/L (ref 3–14)
APPEARANCE UR: ABNORMAL
APPEARANCE UR: ABNORMAL
APTT PPP: 29 SECONDS (ref 22–38)
APTT PPP: 32 SECONDS (ref 22–38)
AST SERPL W P-5'-P-CCNC: 196 U/L (ref 0–45)
AST SERPL W P-5'-P-CCNC: 41 U/L (ref 0–45)
AST SERPL W P-5'-P-CCNC: 46 U/L (ref 0–45)
AST SERPL W P-5'-P-CCNC: 47 U/L (ref 0–45)
AST SERPL W P-5'-P-CCNC: 48 U/L (ref 0–45)
AST SERPL W P-5'-P-CCNC: 69 U/L (ref 0–45)
ATRIAL RATE - MUSE: 38 BPM
ATRIAL RATE - MUSE: 72 BPM
BACTERIA UR CULT: NO GROWTH
BACTERIA UR CULT: NORMAL
BARBITURATES UR QL: NORMAL
BASE EXCESS BLDA CALC-SCNC: -0.3 MMOL/L (ref -9–1.8)
BASE EXCESS BLDA CALC-SCNC: -0.5 MMOL/L (ref -9–1.8)
BASE EXCESS BLDA CALC-SCNC: -0.7 MMOL/L (ref -9–1.8)
BASE EXCESS BLDA CALC-SCNC: -0.9 MMOL/L (ref -9–1.8)
BASE EXCESS BLDA CALC-SCNC: -1 MMOL/L (ref -9–1.8)
BASE EXCESS BLDA CALC-SCNC: -1.1 MMOL/L (ref -9–1.8)
BASE EXCESS BLDA CALC-SCNC: -1.1 MMOL/L (ref -9–1.8)
BASE EXCESS BLDA CALC-SCNC: -1.4 MMOL/L (ref -9–1.8)
BASE EXCESS BLDA CALC-SCNC: -1.5 MMOL/L (ref -9–1.8)
BASE EXCESS BLDA CALC-SCNC: -1.7 MMOL/L (ref -9–1.8)
BASE EXCESS BLDA CALC-SCNC: -1.8 MMOL/L (ref -9–1.8)
BASE EXCESS BLDA CALC-SCNC: -1.9 MMOL/L (ref -9–1.8)
BASE EXCESS BLDA CALC-SCNC: -1.9 MMOL/L (ref -9–1.8)
BASE EXCESS BLDA CALC-SCNC: -2 MMOL/L (ref -9–1.8)
BASE EXCESS BLDA CALC-SCNC: 0 MMOL/L (ref -9–1.8)
BASE EXCESS BLDA CALC-SCNC: 0.2 MMOL/L (ref -9–1.8)
BASE EXCESS BLDV CALC-SCNC: -1.6 MMOL/L (ref -7.7–1.9)
BASE EXCESS BLDV CALC-SCNC: -4.6 MMOL/L (ref -7.7–1.9)
BASE EXCESS BLDV CALC-SCNC: -7.7 MMOL/L (ref -7.7–1.9)
BASOPHILS # BLD MANUAL: 0 10E3/UL (ref 0–0.2)
BASOPHILS NFR BLD MANUAL: 0 %
BENZODIAZ UR QL: NORMAL
BILIRUB SERPL-MCNC: 0.4 MG/DL (ref 0.2–1.3)
BILIRUB SERPL-MCNC: 0.7 MG/DL (ref 0.2–1.3)
BILIRUB SERPL-MCNC: 1.1 MG/DL (ref 0.2–1.3)
BILIRUB SERPL-MCNC: 1.2 MG/DL (ref 0.2–1.3)
BILIRUB SERPL-MCNC: 1.2 MG/DL (ref 0.2–1.3)
BILIRUB SERPL-MCNC: 1.4 MG/DL (ref 0.2–1.3)
BILIRUB UR QL STRIP: NEGATIVE
BILIRUB UR QL STRIP: NEGATIVE
BUN SERPL-MCNC: 18 MG/DL (ref 7–30)
BUN SERPL-MCNC: 21 MG/DL (ref 7–30)
BUN SERPL-MCNC: 24 MG/DL (ref 7–30)
BUN SERPL-MCNC: 31 MG/DL (ref 7–30)
BUN SERPL-MCNC: 36 MG/DL (ref 7–30)
BUN SERPL-MCNC: 36 MG/DL (ref 7–30)
CALCIUM SERPL-MCNC: 8.2 MG/DL (ref 8.5–10.1)
CALCIUM SERPL-MCNC: 8.4 MG/DL (ref 8.5–10.1)
CALCIUM SERPL-MCNC: 8.7 MG/DL (ref 8.5–10.1)
CALCIUM SERPL-MCNC: 9.1 MG/DL (ref 8.5–10.1)
CANNABINOIDS UR QL SCN: NORMAL
CHLORIDE BLD-SCNC: 107 MMOL/L (ref 94–109)
CHLORIDE BLD-SCNC: 108 MMOL/L (ref 94–109)
CHLORIDE BLD-SCNC: 109 MMOL/L (ref 94–109)
CHLORIDE BLD-SCNC: 112 MMOL/L (ref 94–109)
CHLORIDE BLD-SCNC: 112 MMOL/L (ref 94–109)
CHLORIDE SERPL-SCNC: 104 MMOL/L (ref 94–109)
CO2 SERPL-SCNC: 21 MMOL/L (ref 20–32)
CO2 SERPL-SCNC: 22 MMOL/L (ref 20–32)
CO2 SERPL-SCNC: 23 MMOL/L (ref 20–32)
CO2 SERPL-SCNC: 24 MMOL/L (ref 20–32)
CO2 SERPL-SCNC: 24 MMOL/L (ref 20–32)
CO2 SERPL-SCNC: 27 MMOL/L (ref 20–32)
COCAINE UR QL: NORMAL
COLOR UR AUTO: YELLOW
COLOR UR AUTO: YELLOW
CREAT SERPL-MCNC: 1.05 MG/DL (ref 0.52–1.04)
CREAT SERPL-MCNC: 1.05 MG/DL (ref 0.52–1.04)
CREAT SERPL-MCNC: 1.06 MG/DL (ref 0.52–1.04)
CREAT SERPL-MCNC: 1.18 MG/DL (ref 0.52–1.04)
CREAT SERPL-MCNC: 1.21 MG/DL (ref 0.52–1.04)
CREAT SERPL-MCNC: 1.39 MG/DL (ref 0.52–1.04)
DIASTOLIC BLOOD PRESSURE - MUSE: NORMAL MMHG
DIASTOLIC BLOOD PRESSURE - MUSE: NORMAL MMHG
EOSINOPHIL # BLD MANUAL: 0.4 10E3/UL (ref 0–0.7)
EOSINOPHIL NFR BLD MANUAL: 2 %
ERCP: NORMAL
ERYTHROCYTE [DISTWIDTH] IN BLOOD BY AUTOMATED COUNT: 13 % (ref 10–15)
ERYTHROCYTE [DISTWIDTH] IN BLOOD BY AUTOMATED COUNT: 13.3 % (ref 10–15)
ERYTHROCYTE [DISTWIDTH] IN BLOOD BY AUTOMATED COUNT: 13.4 % (ref 10–15)
ERYTHROCYTE [DISTWIDTH] IN BLOOD BY AUTOMATED COUNT: 13.5 % (ref 10–15)
ERYTHROCYTE [DISTWIDTH] IN BLOOD BY AUTOMATED COUNT: 13.7 % (ref 10–15)
ERYTHROCYTE [DISTWIDTH] IN BLOOD BY AUTOMATED COUNT: 14.1 % (ref 10–15)
FIBRINOGEN PPP-MCNC: 421 MG/DL (ref 170–490)
GFR SERPL CREATININE-BSD FRML MDRD: 39 ML/MIN/1.73M2
GFR SERPL CREATININE-BSD FRML MDRD: 46 ML/MIN/1.73M2
GFR SERPL CREATININE-BSD FRML MDRD: 47 ML/MIN/1.73M2
GFR SERPL CREATININE-BSD FRML MDRD: 51 ML/MIN/{1.73_M2}
GFR SERPL CREATININE-BSD FRML MDRD: 54 ML/MIN/1.73M2
GFR SERPL CREATININE-BSD FRML MDRD: 54 ML/MIN/1.73M2
GLUCOSE BLD-MCNC: 116 MG/DL (ref 70–99)
GLUCOSE BLD-MCNC: 116 MG/DL (ref 70–99)
GLUCOSE BLD-MCNC: 167 MG/DL (ref 70–99)
GLUCOSE BLD-MCNC: 206 MG/DL (ref 70–99)
GLUCOSE BLD-MCNC: 297 MG/DL (ref 70–99)
GLUCOSE BLDC GLUCOMTR-MCNC: 106 MG/DL (ref 70–99)
GLUCOSE BLDC GLUCOMTR-MCNC: 110 MG/DL (ref 70–99)
GLUCOSE BLDC GLUCOMTR-MCNC: 115 MG/DL (ref 70–99)
GLUCOSE BLDC GLUCOMTR-MCNC: 124 MG/DL (ref 70–99)
GLUCOSE BLDC GLUCOMTR-MCNC: 126 MG/DL (ref 70–99)
GLUCOSE BLDC GLUCOMTR-MCNC: 130 MG/DL (ref 70–99)
GLUCOSE BLDC GLUCOMTR-MCNC: 133 MG/DL (ref 70–99)
GLUCOSE BLDC GLUCOMTR-MCNC: 144 MG/DL (ref 70–99)
GLUCOSE BLDC GLUCOMTR-MCNC: 161 MG/DL (ref 70–99)
GLUCOSE BLDC GLUCOMTR-MCNC: 167 MG/DL (ref 70–99)
GLUCOSE BLDC GLUCOMTR-MCNC: 175 MG/DL (ref 70–99)
GLUCOSE SERPL-MCNC: 114 MG/DL (ref 70–99)
GLUCOSE UR STRIP-MCNC: 30 MG/DL
GLUCOSE UR STRIP-MCNC: >499 MG/DL
HBA1C MFR BLD: 5.6 % (ref 0–5.6)
HCO3 BLD-SCNC: 23 MMOL/L (ref 21–28)
HCO3 BLD-SCNC: 23 MMOL/L (ref 21–28)
HCO3 BLD-SCNC: 24 MMOL/L (ref 21–28)
HCO3 BLD-SCNC: 25 MMOL/L (ref 21–28)
HCO3 BLD-SCNC: 26 MMOL/L (ref 21–28)
HCO3 BLDV-SCNC: 22 MMOL/L (ref 21–28)
HCO3 BLDV-SCNC: 25 MMOL/L (ref 21–28)
HCO3 BLDV-SCNC: 28 MMOL/L (ref 21–28)
HCT VFR BLD AUTO: 36.1 % (ref 35–47)
HCT VFR BLD AUTO: 37.5 % (ref 35–47)
HCT VFR BLD AUTO: 40.9 % (ref 35–47)
HCT VFR BLD AUTO: 41.9 % (ref 35–47)
HCT VFR BLD AUTO: 42.8 % (ref 35–47)
HCT VFR BLD AUTO: 43.9 % (ref 35–47)
HGB BLD-MCNC: 11.7 G/DL (ref 11.7–15.7)
HGB BLD-MCNC: 12 G/DL (ref 11.7–15.7)
HGB BLD-MCNC: 13.2 G/DL (ref 11.7–15.7)
HGB BLD-MCNC: 13.3 G/DL (ref 11.7–15.7)
HGB BLD-MCNC: 13.3 G/DL (ref 11.7–15.7)
HGB BLD-MCNC: 14.2 G/DL (ref 11.7–15.7)
HGB UR QL STRIP: ABNORMAL
HGB UR QL STRIP: ABNORMAL
HYALINE CASTS: 92 /LPF
INR PPP: 1.12 (ref 0.86–1.14)
INR PPP: 1.21 (ref 0.85–1.15)
INR PPP: 1.21 (ref 0.85–1.15)
INR PPP: 1.27 (ref 0.85–1.15)
INTERPRETATION ECG - MUSE: NORMAL
INTERPRETATION ECG - MUSE: NORMAL
KETONES UR STRIP-MCNC: NEGATIVE MG/DL
KETONES UR STRIP-MCNC: NEGATIVE MG/DL
LACTATE SERPL-SCNC: 1.8 MMOL/L (ref 0.7–2)
LACTATE SERPL-SCNC: 1.9 MMOL/L (ref 0.7–2)
LACTATE SERPL-SCNC: 2 MMOL/L (ref 0.7–2)
LACTATE SERPL-SCNC: 2.1 MMOL/L (ref 0.7–2)
LACTATE SERPL-SCNC: 2.2 MMOL/L (ref 0.7–2)
LACTATE SERPL-SCNC: 2.3 MMOL/L (ref 0.7–2)
LACTATE SERPL-SCNC: 2.4 MMOL/L (ref 0.7–2)
LACTATE SERPL-SCNC: 2.6 MMOL/L (ref 0.7–2)
LACTATE SERPL-SCNC: 2.8 MMOL/L (ref 0.7–2)
LACTATE SERPL-SCNC: 3 MMOL/L (ref 0.7–2)
LACTATE SERPL-SCNC: 3.2 MMOL/L (ref 0.7–2)
LACTATE SERPL-SCNC: 3.4 MMOL/L (ref 0.7–2)
LACTATE SERPL-SCNC: 8.8 MMOL/L (ref 0.7–2)
LEUKOCYTE ESTERASE UR QL STRIP: NEGATIVE
LEUKOCYTE ESTERASE UR QL STRIP: NEGATIVE
LIPASE SERPL-CCNC: 136 U/L (ref 73–393)
LVEF ECHO: NORMAL
LYMPHOCYTES # BLD MANUAL: 11.4 10E3/UL (ref 0.8–5.3)
LYMPHOCYTES NFR BLD MANUAL: 58 %
MAGNESIUM SERPL-MCNC: 1.7 MG/DL (ref 1.6–2.3)
MAGNESIUM SERPL-MCNC: 1.8 MG/DL (ref 1.6–2.3)
MAGNESIUM SERPL-MCNC: 1.9 MG/DL (ref 1.6–2.3)
MAGNESIUM SERPL-MCNC: 2 MG/DL (ref 1.6–2.3)
MCH RBC QN AUTO: 29.4 PG (ref 26.5–33)
MCH RBC QN AUTO: 29.6 PG (ref 26.5–33)
MCH RBC QN AUTO: 29.9 PG (ref 26.5–33)
MCH RBC QN AUTO: 30 PG (ref 26.5–33)
MCH RBC QN AUTO: 30.1 PG (ref 26.5–33)
MCH RBC QN AUTO: 30.3 PG (ref 26.5–33)
MCHC RBC AUTO-ENTMCNC: 31.1 G/DL (ref 31.5–36.5)
MCHC RBC AUTO-ENTMCNC: 31.7 G/DL (ref 31.5–36.5)
MCHC RBC AUTO-ENTMCNC: 32 G/DL (ref 31.5–36.5)
MCHC RBC AUTO-ENTMCNC: 32.3 G/DL (ref 31.5–36.5)
MCHC RBC AUTO-ENTMCNC: 32.3 G/DL (ref 31.5–36.5)
MCHC RBC AUTO-ENTMCNC: 32.4 G/DL (ref 31.5–36.5)
MCV RBC AUTO: 91 FL (ref 78–100)
MCV RBC AUTO: 92 FL (ref 78–100)
MCV RBC AUTO: 93 FL (ref 78–100)
MCV RBC AUTO: 93 FL (ref 78–100)
MCV RBC AUTO: 95 FL (ref 78–100)
MCV RBC AUTO: 98 FL (ref 78–100)
MONOCYTES # BLD MANUAL: 0.6 10E3/UL (ref 0–1.3)
MONOCYTES NFR BLD MANUAL: 3 %
MRSA DNA SPEC QL NAA+PROBE: NEGATIVE
MUCOUS THREADS #/AREA URNS LPF: PRESENT /LPF
MUCOUS THREADS #/AREA URNS LPF: PRESENT /LPF
NEUTROPHILS # BLD MANUAL: 7.3 10E3/UL (ref 1.6–8.3)
NEUTROPHILS NFR BLD MANUAL: 37 %
NITRATE UR QL: NEGATIVE
NITRATE UR QL: NEGATIVE
NT-PROBNP SERPL-MCNC: 1728 PG/ML (ref 0–1800)
O2/TOTAL GAS SETTING VFR VENT: 100 %
O2/TOTAL GAS SETTING VFR VENT: 40 %
O2/TOTAL GAS SETTING VFR VENT: 60 %
O2/TOTAL GAS SETTING VFR VENT: 80 %
OPIATES UR QL SCN: NORMAL
OXYHGB MFR BLD: 95 % (ref 92–100)
OXYHGB MFR BLD: 97 % (ref 92–100)
OXYHGB MFR BLD: 99 % (ref 92–100)
P AXIS - MUSE: -27 DEGREES
P AXIS - MUSE: 54 DEGREES
PCO2 BLD: 36 MM HG (ref 35–45)
PCO2 BLD: 37 MM HG (ref 35–45)
PCO2 BLD: 37 MM HG (ref 35–45)
PCO2 BLD: 43 MM HG (ref 35–45)
PCO2 BLD: 43 MM HG (ref 35–45)
PCO2 BLD: 45 MM HG (ref 35–45)
PCO2 BLD: 45 MM HG (ref 35–45)
PCO2 BLD: 46 MM HG (ref 35–45)
PCO2 BLD: 46 MM HG (ref 35–45)
PCO2 BLD: 48 MM HG (ref 35–45)
PCO2 BLD: 50 MM HG (ref 35–45)
PCO2 BLD: 51 MM HG (ref 35–45)
PCO2 BLD: 52 MM HG (ref 35–45)
PCO2 BLD: 53 MM HG (ref 35–45)
PCO2 BLD: 53 MM HG (ref 35–45)
PCO2 BLD: 55 MM HG (ref 35–45)
PCO2 BLDV: 64 MM HG (ref 40–50)
PCO2 BLDV: 66 MM HG (ref 40–50)
PCO2 BLDV: 73 MM HG (ref 40–50)
PCP UR QL SCN: NORMAL
PH BLD: 7.28 [PH] (ref 7.35–7.45)
PH BLD: 7.29 [PH] (ref 7.35–7.45)
PH BLD: 7.29 [PH] (ref 7.35–7.45)
PH BLD: 7.3 [PH] (ref 7.35–7.45)
PH BLD: 7.31 [PH] (ref 7.35–7.45)
PH BLD: 7.31 [PH] (ref 7.35–7.45)
PH BLD: 7.32 [PH] (ref 7.35–7.45)
PH BLD: 7.34 [PH] (ref 7.35–7.45)
PH BLD: 7.35 [PH] (ref 7.35–7.45)
PH BLD: 7.36 [PH] (ref 7.35–7.45)
PH BLD: 7.36 [PH] (ref 7.35–7.45)
PH BLD: 7.37 [PH] (ref 7.35–7.45)
PH BLD: 7.38 [PH] (ref 7.35–7.45)
PH BLD: 7.41 [PH] (ref 7.35–7.45)
PH BLD: 7.41 [PH] (ref 7.35–7.45)
PH BLD: 7.43 [PH] (ref 7.35–7.45)
PH BLDV: 7.14 [PH] (ref 7.32–7.43)
PH BLDV: 7.19 [PH] (ref 7.32–7.43)
PH BLDV: 7.19 [PH] (ref 7.32–7.43)
PH UR STRIP: 5 [PH] (ref 5–7)
PH UR STRIP: 5 [PH] (ref 5–7)
PHOSPHATE SERPL-MCNC: 2.7 MG/DL (ref 2.5–4.5)
PLAT MORPH BLD: ABNORMAL
PLATELET # BLD AUTO: 163 10E3/UL (ref 150–450)
PLATELET # BLD AUTO: 191 10E3/UL (ref 150–450)
PLATELET # BLD AUTO: 202 10E3/UL (ref 150–450)
PLATELET # BLD AUTO: 224 10E9/L (ref 150–450)
PLATELET # BLD AUTO: 241 10E3/UL (ref 150–450)
PLATELET # BLD AUTO: 275 10E3/UL (ref 150–450)
PO2 BLD: 100 MM HG (ref 80–105)
PO2 BLD: 115 MM HG (ref 80–105)
PO2 BLD: 117 MM HG (ref 80–105)
PO2 BLD: 121 MM HG (ref 80–105)
PO2 BLD: 134 MM HG (ref 80–105)
PO2 BLD: 170 MM HG (ref 80–105)
PO2 BLD: 170 MM HG (ref 80–105)
PO2 BLD: 305 MM HG (ref 80–105)
PO2 BLD: 57 MM HG (ref 80–105)
PO2 BLD: 58 MM HG (ref 80–105)
PO2 BLD: 72 MM HG (ref 80–105)
PO2 BLD: 80 MM HG (ref 80–105)
PO2 BLD: 83 MM HG (ref 80–105)
PO2 BLD: 84 MM HG (ref 80–105)
PO2 BLD: 89 MM HG (ref 80–105)
PO2 BLD: 89 MM HG (ref 80–105)
PO2 BLDV: 21 MM HG (ref 25–47)
PO2 BLDV: 39 MM HG (ref 25–47)
PO2 BLDV: 53 MM HG (ref 25–47)
POTASSIUM BLD-SCNC: 3.2 MMOL/L (ref 3.4–5.3)
POTASSIUM BLD-SCNC: 3.8 MMOL/L (ref 3.4–5.3)
POTASSIUM BLD-SCNC: 3.9 MMOL/L (ref 3.4–5.3)
POTASSIUM BLD-SCNC: 4 MMOL/L (ref 3.4–5.3)
POTASSIUM BLD-SCNC: 4 MMOL/L (ref 3.4–5.3)
POTASSIUM BLD-SCNC: 4.2 MMOL/L (ref 3.4–5.3)
POTASSIUM SERPL-SCNC: 3.6 MMOL/L (ref 3.4–5.3)
PR INTERVAL - MUSE: 206 MS
PR INTERVAL - MUSE: NORMAL MS
PROCALCITONIN SERPL-MCNC: 21.15 NG/ML
PROT SERPL-MCNC: 6.2 G/DL (ref 6.8–8.8)
PROT SERPL-MCNC: 6.2 G/DL (ref 6.8–8.8)
PROT SERPL-MCNC: 6.5 G/DL (ref 6.8–8.8)
PROT SERPL-MCNC: 6.5 G/DL (ref 6.8–8.8)
PROT SERPL-MCNC: 6.6 G/DL (ref 6.8–8.8)
PROT SERPL-MCNC: 7.4 G/DL (ref 6.8–8.8)
QRS DURATION - MUSE: 106 MS
QRS DURATION - MUSE: 134 MS
QT - MUSE: 508 MS
QT - MUSE: 634 MS
QTC - MUSE: 490 MS
QTC - MUSE: 556 MS
R AXIS - MUSE: -6 DEGREES
R AXIS - MUSE: 18 DEGREES
RADIOLOGIST FLAGS: ABNORMAL
RBC # BLD AUTO: 3.98 10E6/UL (ref 3.8–5.2)
RBC # BLD AUTO: 4.06 10E6/UL (ref 3.8–5.2)
RBC # BLD AUTO: 4.39 10E6/UL (ref 3.8–5.2)
RBC # BLD AUTO: 4.42 10E6/UL (ref 3.8–5.2)
RBC # BLD AUTO: 4.43 10E6/UL (ref 3.8–5.2)
RBC # BLD AUTO: 4.71 10E12/L (ref 3.8–5.2)
RBC MORPH BLD: ABNORMAL
RBC URINE: 41 /HPF
RBC URINE: >182 /HPF
SA TARGET DNA: NEGATIVE
SARS-COV-2 RNA RESP QL NAA+PROBE: NEGATIVE
SODIUM SERPL-SCNC: 136 MMOL/L (ref 133–144)
SODIUM SERPL-SCNC: 142 MMOL/L (ref 133–144)
SODIUM SERPL-SCNC: 143 MMOL/L (ref 133–144)
SODIUM SERPL-SCNC: 144 MMOL/L (ref 133–144)
SODIUM SERPL-SCNC: 144 MMOL/L (ref 133–144)
SODIUM SERPL-SCNC: 146 MMOL/L (ref 133–144)
SP GR UR STRIP: 1.02 (ref 1–1.03)
SP GR UR STRIP: 1.03 (ref 1–1.03)
SQUAMOUS EPITHELIAL: 1 /HPF
SYSTOLIC BLOOD PRESSURE - MUSE: NORMAL MMHG
SYSTOLIC BLOOD PRESSURE - MUSE: NORMAL MMHG
T AXIS - MUSE: 13 DEGREES
T AXIS - MUSE: 83 DEGREES
TRANSITIONAL EPI: 1 /HPF
TROPONIN I SERPL HS-MCNC: 216 NG/L
TROPONIN I SERPL HS-MCNC: 245 NG/L
TROPONIN I SERPL HS-MCNC: 331 NG/L
TROPONIN I SERPL HS-MCNC: 340 NG/L
TROPONIN I SERPL HS-MCNC: 374 NG/L
TROPONIN I SERPL HS-MCNC: 44 NG/L
TROPONIN I SERPL HS-MCNC: 531 NG/L
TSH SERPL DL<=0.005 MIU/L-ACNC: 1.14 MU/L (ref 0.4–4)
UROBILINOGEN UR STRIP-MCNC: 2 MG/DL
UROBILINOGEN UR STRIP-MCNC: NORMAL MG/DL
VENTRICULAR RATE- MUSE: 36 BPM
VENTRICULAR RATE- MUSE: 72 BPM
WBC # BLD AUTO: 18.5 10E3/UL (ref 4–11)
WBC # BLD AUTO: 19.7 10E3/UL (ref 4–11)
WBC # BLD AUTO: 20.9 10E3/UL (ref 4–11)
WBC # BLD AUTO: 21.1 10E3/UL (ref 4–11)
WBC # BLD AUTO: 26.4 10E3/UL (ref 4–11)
WBC # BLD AUTO: 8.9 10E9/L (ref 4–11)
WBC URINE: 26 /HPF
WBC URINE: 47 /HPF

## 2021-01-01 PROCEDURE — 99292 CRITICAL CARE ADDL 30 MIN: CPT | Mod: 25 | Performed by: EMERGENCY MEDICINE

## 2021-01-01 PROCEDURE — 87635 SARS-COV-2 COVID-19 AMP PRB: CPT | Performed by: EMERGENCY MEDICINE

## 2021-01-01 PROCEDURE — 74018 RADEX ABDOMEN 1 VIEW: CPT | Mod: 26 | Performed by: RADIOLOGY

## 2021-01-01 PROCEDURE — 99291 CRITICAL CARE FIRST HOUR: CPT | Mod: 25 | Performed by: EMERGENCY MEDICINE

## 2021-01-01 PROCEDURE — 82805 BLOOD GASES W/O2 SATURATION: CPT | Performed by: INTERNAL MEDICINE

## 2021-01-01 PROCEDURE — 99292 CRITICAL CARE ADDL 30 MIN: CPT | Mod: 25 | Performed by: INTERNAL MEDICINE

## 2021-01-01 PROCEDURE — 70450 CT HEAD/BRAIN W/O DYE: CPT

## 2021-01-01 PROCEDURE — 5A1955Z RESPIRATORY VENTILATION, GREATER THAN 96 CONSECUTIVE HOURS: ICD-10-PCS | Performed by: INTERNAL MEDICINE

## 2021-01-01 PROCEDURE — 272N000001 HC OR GENERAL SUPPLY STERILE: Performed by: INTERNAL MEDICINE

## 2021-01-01 PROCEDURE — 93005 ELECTROCARDIOGRAM TRACING: CPT | Performed by: EMERGENCY MEDICINE

## 2021-01-01 PROCEDURE — 82962 GLUCOSE BLOOD TEST: CPT

## 2021-01-01 PROCEDURE — 82803 BLOOD GASES ANY COMBINATION: CPT | Performed by: STUDENT IN AN ORGANIZED HEALTH CARE EDUCATION/TRAINING PROGRAM

## 2021-01-01 PROCEDURE — 85610 PROTHROMBIN TIME: CPT | Performed by: PHYSICIAN ASSISTANT

## 2021-01-01 PROCEDURE — 255N000002 HC RX 255 OP 636: Performed by: INTERNAL MEDICINE

## 2021-01-01 PROCEDURE — 3E043XZ INTRODUCTION OF VASOPRESSOR INTO CENTRAL VEIN, PERCUTANEOUS APPROACH: ICD-10-PCS | Performed by: INTERNAL MEDICINE

## 2021-01-01 PROCEDURE — 84484 ASSAY OF TROPONIN QUANT: CPT | Performed by: STUDENT IN AN ORGANIZED HEALTH CARE EDUCATION/TRAINING PROGRAM

## 2021-01-01 PROCEDURE — 370N000017 HC ANESTHESIA TECHNICAL FEE, PER MIN: Performed by: INTERNAL MEDICINE

## 2021-01-01 PROCEDURE — 83690 ASSAY OF LIPASE: CPT | Performed by: STUDENT IN AN ORGANIZED HEALTH CARE EDUCATION/TRAINING PROGRAM

## 2021-01-01 PROCEDURE — 71250 CT THORAX DX C-: CPT | Mod: 26 | Performed by: RADIOLOGY

## 2021-01-01 PROCEDURE — 83605 ASSAY OF LACTIC ACID: CPT | Performed by: STUDENT IN AN ORGANIZED HEALTH CARE EDUCATION/TRAINING PROGRAM

## 2021-01-01 PROCEDURE — C1726 CATH, BAL DIL, NON-VASCULAR: HCPCS | Performed by: INTERNAL MEDICINE

## 2021-01-01 PROCEDURE — 87040 BLOOD CULTURE FOR BACTERIA: CPT | Performed by: INTERNAL MEDICINE

## 2021-01-01 PROCEDURE — 83735 ASSAY OF MAGNESIUM: CPT | Performed by: EMERGENCY MEDICINE

## 2021-01-01 PROCEDURE — 250N000013 HC RX MED GY IP 250 OP 250 PS 637: Performed by: NURSE PRACTITIONER

## 2021-01-01 PROCEDURE — 200N000002 HC R&B ICU UMMC

## 2021-01-01 PROCEDURE — 258N000003 HC RX IP 258 OP 636: Performed by: EMERGENCY MEDICINE

## 2021-01-01 PROCEDURE — 258N000003 HC RX IP 258 OP 636: Performed by: INTERNAL MEDICINE

## 2021-01-01 PROCEDURE — 250N000009 HC RX 250: Performed by: STUDENT IN AN ORGANIZED HEALTH CARE EDUCATION/TRAINING PROGRAM

## 2021-01-01 PROCEDURE — 85610 PROTHROMBIN TIME: CPT | Performed by: STUDENT IN AN ORGANIZED HEALTH CARE EDUCATION/TRAINING PROGRAM

## 2021-01-01 PROCEDURE — 84132 ASSAY OF SERUM POTASSIUM: CPT | Performed by: INTERNAL MEDICINE

## 2021-01-01 PROCEDURE — 250N000009 HC RX 250: Performed by: PHYSICIAN ASSISTANT

## 2021-01-01 PROCEDURE — 91306 COVID-19,PF,MODERNA (18+ YRS BOOSTER .25ML): CPT

## 2021-01-01 PROCEDURE — 250N000011 HC RX IP 250 OP 636: Performed by: INTERNAL MEDICINE

## 2021-01-01 PROCEDURE — 36415 COLL VENOUS BLD VENIPUNCTURE: CPT | Performed by: STUDENT IN AN ORGANIZED HEALTH CARE EDUCATION/TRAINING PROGRAM

## 2021-01-01 PROCEDURE — 5A1223Z PERFORMANCE OF CARDIAC PACING, CONTINUOUS: ICD-10-PCS | Performed by: INTERNAL MEDICINE

## 2021-01-01 PROCEDURE — C1769 GUIDE WIRE: HCPCS | Performed by: INTERNAL MEDICINE

## 2021-01-01 PROCEDURE — 33210 INSERT ELECTRD/PM CATH SNGL: CPT

## 2021-01-01 PROCEDURE — 999N000157 HC STATISTIC RCP TIME EA 10 MIN

## 2021-01-01 PROCEDURE — 999N000179 XR SURGERY CARM FLUORO LESS THAN 5 MIN W STILLS: Mod: TC

## 2021-01-01 PROCEDURE — 250N000009 HC RX 250: Performed by: NURSE ANESTHETIST, CERTIFIED REGISTERED

## 2021-01-01 PROCEDURE — 36592 COLLECT BLOOD FROM PICC: CPT | Performed by: INTERNAL MEDICINE

## 2021-01-01 PROCEDURE — 95720 EEG PHY/QHP EA INCR W/VEEG: CPT | Performed by: PSYCHIATRY & NEUROLOGY

## 2021-01-01 PROCEDURE — 82040 ASSAY OF SERUM ALBUMIN: CPT | Performed by: STUDENT IN AN ORGANIZED HEALTH CARE EDUCATION/TRAINING PROGRAM

## 2021-01-01 PROCEDURE — 84145 PROCALCITONIN (PCT): CPT | Performed by: STUDENT IN AN ORGANIZED HEALTH CARE EDUCATION/TRAINING PROGRAM

## 2021-01-01 PROCEDURE — 82803 BLOOD GASES ANY COMBINATION: CPT | Mod: 91 | Performed by: EMERGENCY MEDICINE

## 2021-01-01 PROCEDURE — 87070 CULTURE OTHR SPECIMN AEROBIC: CPT | Performed by: STUDENT IN AN ORGANIZED HEALTH CARE EDUCATION/TRAINING PROGRAM

## 2021-01-01 PROCEDURE — 71045 X-RAY EXAM CHEST 1 VIEW: CPT

## 2021-01-01 PROCEDURE — 99233 SBSQ HOSP IP/OBS HIGH 50: CPT | Mod: 25 | Performed by: PSYCHIATRY & NEUROLOGY

## 2021-01-01 PROCEDURE — 250N000009 HC RX 250: Performed by: INTERNAL MEDICINE

## 2021-01-01 PROCEDURE — 93308 TTE F-UP OR LMTD: CPT | Performed by: EMERGENCY MEDICINE

## 2021-01-01 PROCEDURE — 93005 ELECTROCARDIOGRAM TRACING: CPT

## 2021-01-01 PROCEDURE — 99207 PR NO CHARGE LOS: CPT

## 2021-01-01 PROCEDURE — 80307 DRUG TEST PRSMV CHEM ANLYZR: CPT | Performed by: EMERGENCY MEDICINE

## 2021-01-01 PROCEDURE — C9113 INJ PANTOPRAZOLE SODIUM, VIA: HCPCS | Performed by: STUDENT IN AN ORGANIZED HEALTH CARE EDUCATION/TRAINING PROGRAM

## 2021-01-01 PROCEDURE — 36556 INSERT NON-TUNNEL CV CATH: CPT | Mod: 59 | Performed by: INTERNAL MEDICINE

## 2021-01-01 PROCEDURE — 71045 X-RAY EXAM CHEST 1 VIEW: CPT | Mod: 26 | Performed by: RADIOLOGY

## 2021-01-01 PROCEDURE — 85610 PROTHROMBIN TIME: CPT | Mod: GZ | Performed by: STUDENT IN AN ORGANIZED HEALTH CARE EDUCATION/TRAINING PROGRAM

## 2021-01-01 PROCEDURE — 250N000011 HC RX IP 250 OP 636: Performed by: STUDENT IN AN ORGANIZED HEALTH CARE EDUCATION/TRAINING PROGRAM

## 2021-01-01 PROCEDURE — 84484 ASSAY OF TROPONIN QUANT: CPT | Performed by: INTERNAL MEDICINE

## 2021-01-01 PROCEDURE — 250N000011 HC RX IP 250 OP 636

## 2021-01-01 PROCEDURE — 95714 VEEG EA 12-26 HR UNMNTR: CPT

## 2021-01-01 PROCEDURE — 93005 ELECTROCARDIOGRAM TRACING: CPT | Mod: 76,59 | Performed by: EMERGENCY MEDICINE

## 2021-01-01 PROCEDURE — 83605 ASSAY OF LACTIC ACID: CPT | Performed by: INTERNAL MEDICINE

## 2021-01-01 PROCEDURE — 82810 BLOOD GASES O2 SAT ONLY: CPT | Performed by: INTERNAL MEDICINE

## 2021-01-01 PROCEDURE — 85027 COMPLETE CBC AUTOMATED: CPT | Performed by: EMERGENCY MEDICINE

## 2021-01-01 PROCEDURE — 85730 THROMBOPLASTIN TIME PARTIAL: CPT | Performed by: STUDENT IN AN ORGANIZED HEALTH CARE EDUCATION/TRAINING PROGRAM

## 2021-01-01 PROCEDURE — 999N000208 ECHOCARDIOGRAM COMPLETE

## 2021-01-01 PROCEDURE — 85027 COMPLETE CBC AUTOMATED: CPT | Performed by: STUDENT IN AN ORGANIZED HEALTH CARE EDUCATION/TRAINING PROGRAM

## 2021-01-01 PROCEDURE — 70450 CT HEAD/BRAIN W/O DYE: CPT | Mod: 26 | Performed by: RADIOLOGY

## 2021-01-01 PROCEDURE — 710N000010 HC RECOVERY PHASE 1, LEVEL 2, PER MIN: Performed by: INTERNAL MEDICINE

## 2021-01-01 PROCEDURE — 74176 CT ABD & PELVIS W/O CONTRAST: CPT | Mod: 26 | Performed by: RADIOLOGY

## 2021-01-01 PROCEDURE — 51702 INSERT TEMP BLADDER CATH: CPT | Performed by: EMERGENCY MEDICINE

## 2021-01-01 PROCEDURE — 82803 BLOOD GASES ANY COMBINATION: CPT | Performed by: EMERGENCY MEDICINE

## 2021-01-01 PROCEDURE — 82803 BLOOD GASES ANY COMBINATION: CPT | Performed by: INTERNAL MEDICINE

## 2021-01-01 PROCEDURE — 93970 EXTREMITY STUDY: CPT

## 2021-01-01 PROCEDURE — 999N000185 HC STATISTIC TRANSPORT TIME EA 15 MIN

## 2021-01-01 PROCEDURE — 87086 URINE CULTURE/COLONY COUNT: CPT | Performed by: STUDENT IN AN ORGANIZED HEALTH CARE EDUCATION/TRAINING PROGRAM

## 2021-01-01 PROCEDURE — 85014 HEMATOCRIT: CPT | Performed by: STUDENT IN AN ORGANIZED HEALTH CARE EDUCATION/TRAINING PROGRAM

## 2021-01-01 PROCEDURE — 87086 URINE CULTURE/COLONY COUNT: CPT | Performed by: EMERGENCY MEDICINE

## 2021-01-01 PROCEDURE — 360N000083 HC SURGERY LEVEL 3 W/ FLUORO, PER MIN: Performed by: INTERNAL MEDICINE

## 2021-01-01 PROCEDURE — 80053 COMPREHEN METABOLIC PANEL: CPT | Performed by: EMERGENCY MEDICINE

## 2021-01-01 PROCEDURE — 94640 AIRWAY INHALATION TREATMENT: CPT | Mod: 76

## 2021-01-01 PROCEDURE — C1894 INTRO/SHEATH, NON-LASER: HCPCS | Performed by: INTERNAL MEDICINE

## 2021-01-01 PROCEDURE — 0064A COVID-19,PF,MODERNA (18+ YRS BOOSTER .25ML): CPT

## 2021-01-01 PROCEDURE — 93010 ELECTROCARDIOGRAM REPORT: CPT | Performed by: EMERGENCY MEDICINE

## 2021-01-01 PROCEDURE — 250N000011 HC RX IP 250 OP 636: Performed by: EMERGENCY MEDICINE

## 2021-01-01 PROCEDURE — 250N000011 HC RX IP 250 OP 636: Performed by: NURSE PRACTITIONER

## 2021-01-01 PROCEDURE — 99153 MOD SED SAME PHYS/QHP EA: CPT | Performed by: INTERNAL MEDICINE

## 2021-01-01 PROCEDURE — 250N000011 HC RX IP 250 OP 636: Performed by: NURSE ANESTHETIST, CERTIFIED REGISTERED

## 2021-01-01 PROCEDURE — 81001 URINALYSIS AUTO W/SCOPE: CPT | Performed by: STUDENT IN AN ORGANIZED HEALTH CARE EDUCATION/TRAINING PROGRAM

## 2021-01-01 PROCEDURE — 82150 ASSAY OF AMYLASE: CPT | Performed by: STUDENT IN AN ORGANIZED HEALTH CARE EDUCATION/TRAINING PROGRAM

## 2021-01-01 PROCEDURE — 93010 ELECTROCARDIOGRAM REPORT: CPT | Mod: 76 | Performed by: INTERNAL MEDICINE

## 2021-01-01 PROCEDURE — 999N000141 HC STATISTIC PRE-PROCEDURE NURSING ASSESSMENT: Performed by: INTERNAL MEDICINE

## 2021-01-01 PROCEDURE — 96366 THER/PROPH/DIAG IV INF ADDON: CPT | Mod: 59 | Performed by: EMERGENCY MEDICINE

## 2021-01-01 PROCEDURE — 36415 COLL VENOUS BLD VENIPUNCTURE: CPT | Performed by: EMERGENCY MEDICINE

## 2021-01-01 PROCEDURE — 999N000158 HC STATISTIC RCP TIME ED VENT EA 10 MIN

## 2021-01-01 PROCEDURE — 85730 THROMBOPLASTIN TIME PARTIAL: CPT | Performed by: PHYSICIAN ASSISTANT

## 2021-01-01 PROCEDURE — 83735 ASSAY OF MAGNESIUM: CPT | Performed by: NURSE PRACTITIONER

## 2021-01-01 PROCEDURE — 83735 ASSAY OF MAGNESIUM: CPT | Performed by: INTERNAL MEDICINE

## 2021-01-01 PROCEDURE — 999N000065 XR ABDOMEN PORT 1 VIEWS

## 2021-01-01 PROCEDURE — 84155 ASSAY OF PROTEIN SERUM: CPT | Performed by: STUDENT IN AN ORGANIZED HEALTH CARE EDUCATION/TRAINING PROGRAM

## 2021-01-01 PROCEDURE — 36592 COLLECT BLOOD FROM PICC: CPT | Performed by: STUDENT IN AN ORGANIZED HEALTH CARE EDUCATION/TRAINING PROGRAM

## 2021-01-01 PROCEDURE — 99291 CRITICAL CARE FIRST HOUR: CPT | Mod: 25 | Performed by: PSYCHIATRY & NEUROLOGY

## 2021-01-01 PROCEDURE — 87641 MR-STAPH DNA AMP PROBE: CPT | Performed by: STUDENT IN AN ORGANIZED HEALTH CARE EDUCATION/TRAINING PROGRAM

## 2021-01-01 PROCEDURE — 94002 VENT MGMT INPAT INIT DAY: CPT

## 2021-01-01 PROCEDURE — 83605 ASSAY OF LACTIC ACID: CPT | Performed by: EMERGENCY MEDICINE

## 2021-01-01 PROCEDURE — 258N000003 HC RX IP 258 OP 636: Performed by: PHYSICIAN ASSISTANT

## 2021-01-01 PROCEDURE — 710N000012 HC RECOVERY PHASE 2, PER MINUTE: Performed by: INTERNAL MEDICINE

## 2021-01-01 PROCEDURE — 94003 VENT MGMT INPAT SUBQ DAY: CPT

## 2021-01-01 PROCEDURE — 258N000003 HC RX IP 258 OP 636: Performed by: STUDENT IN AN ORGANIZED HEALTH CARE EDUCATION/TRAINING PROGRAM

## 2021-01-01 PROCEDURE — 250N000013 HC RX MED GY IP 250 OP 250 PS 637: Performed by: STUDENT IN AN ORGANIZED HEALTH CARE EDUCATION/TRAINING PROGRAM

## 2021-01-01 PROCEDURE — 99291 CRITICAL CARE FIRST HOUR: CPT | Performed by: INTERNAL MEDICINE

## 2021-01-01 PROCEDURE — 81001 URINALYSIS AUTO W/SCOPE: CPT | Mod: XU | Performed by: EMERGENCY MEDICINE

## 2021-01-01 PROCEDURE — 71250 CT THORAX DX C-: CPT

## 2021-01-01 PROCEDURE — 03HY32Z INSERTION OF MONITORING DEVICE INTO UPPER ARTERY, PERCUTANEOUS APPROACH: ICD-10-PCS | Performed by: INTERNAL MEDICINE

## 2021-01-01 PROCEDURE — C1898 LEAD, PMKR, OTHER THAN TRANS: HCPCS | Performed by: INTERNAL MEDICINE

## 2021-01-01 PROCEDURE — 85610 PROTHROMBIN TIME: CPT | Performed by: EMERGENCY MEDICINE

## 2021-01-01 PROCEDURE — 96376 TX/PRO/DX INJ SAME DRUG ADON: CPT | Performed by: EMERGENCY MEDICINE

## 2021-01-01 PROCEDURE — 83880 ASSAY OF NATRIURETIC PEPTIDE: CPT | Performed by: STUDENT IN AN ORGANIZED HEALTH CARE EDUCATION/TRAINING PROGRAM

## 2021-01-01 PROCEDURE — 93010 ELECTROCARDIOGRAM REPORT: CPT | Performed by: INTERNAL MEDICINE

## 2021-01-01 PROCEDURE — 93970 EXTREMITY STUDY: CPT | Mod: 26 | Performed by: STUDENT IN AN ORGANIZED HEALTH CARE EDUCATION/TRAINING PROGRAM

## 2021-01-01 PROCEDURE — 999N000015 HC STATISTIC ARTERIAL MONITORING DAILY

## 2021-01-01 PROCEDURE — 84100 ASSAY OF PHOSPHORUS: CPT | Performed by: INTERNAL MEDICINE

## 2021-01-01 PROCEDURE — 99152 MOD SED SAME PHYS/QHP 5/>YRS: CPT | Performed by: INTERNAL MEDICINE

## 2021-01-01 PROCEDURE — 84443 ASSAY THYROID STIM HORMONE: CPT | Performed by: EMERGENCY MEDICINE

## 2021-01-01 PROCEDURE — 80053 COMPREHEN METABOLIC PANEL: CPT | Performed by: STUDENT IN AN ORGANIZED HEALTH CARE EDUCATION/TRAINING PROGRAM

## 2021-01-01 PROCEDURE — 93306 TTE W/DOPPLER COMPLETE: CPT | Mod: 26 | Performed by: INTERNAL MEDICINE

## 2021-01-01 PROCEDURE — 94640 AIRWAY INHALATION TREATMENT: CPT

## 2021-01-01 PROCEDURE — 83036 HEMOGLOBIN GLYCOSYLATED A1C: CPT | Performed by: PHYSICIAN ASSISTANT

## 2021-01-01 PROCEDURE — 02HK3JZ INSERTION OF PACEMAKER LEAD INTO RIGHT VENTRICLE, PERCUTANEOUS APPROACH: ICD-10-PCS | Performed by: INTERNAL MEDICINE

## 2021-01-01 PROCEDURE — 85384 FIBRINOGEN ACTIVITY: CPT | Performed by: PHYSICIAN ASSISTANT

## 2021-01-01 PROCEDURE — 83735 ASSAY OF MAGNESIUM: CPT | Performed by: STUDENT IN AN ORGANIZED HEALTH CARE EDUCATION/TRAINING PROGRAM

## 2021-01-01 PROCEDURE — 93308 TTE F-UP OR LMTD: CPT | Mod: 26 | Performed by: EMERGENCY MEDICINE

## 2021-01-01 PROCEDURE — 96367 TX/PROPH/DG ADDL SEQ IV INF: CPT | Mod: 59 | Performed by: EMERGENCY MEDICINE

## 2021-01-01 PROCEDURE — 96365 THER/PROPH/DIAG IV INF INIT: CPT | Mod: 59 | Performed by: EMERGENCY MEDICINE

## 2021-01-01 PROCEDURE — 250N000024 HC ISOFLURANE, PER MIN: Performed by: INTERNAL MEDICINE

## 2021-01-01 PROCEDURE — 84132 ASSAY OF SERUM POTASSIUM: CPT | Performed by: STUDENT IN AN ORGANIZED HEALTH CARE EDUCATION/TRAINING PROGRAM

## 2021-01-01 PROCEDURE — 99291 CRITICAL CARE FIRST HOUR: CPT | Mod: 25 | Performed by: INTERNAL MEDICINE

## 2021-01-01 PROCEDURE — 84484 ASSAY OF TROPONIN QUANT: CPT | Performed by: EMERGENCY MEDICINE

## 2021-01-01 DEVICE — IMP LEAD PACING BIPOLAR CAPSUREFIX NOVUS 65CM 5076-65: Type: IMPLANTABLE DEVICE | Status: FUNCTIONAL

## 2021-01-01 RX ORDER — AMOXICILLIN 250 MG
1 CAPSULE ORAL 2 TIMES DAILY PRN
Status: DISCONTINUED | OUTPATIENT
Start: 2021-01-01 | End: 2022-01-01 | Stop reason: HOSPADM

## 2021-01-01 RX ORDER — LORAZEPAM 2 MG/ML
4 INJECTION INTRAMUSCULAR ONCE
Status: COMPLETED | OUTPATIENT
Start: 2021-01-01 | End: 2021-01-01

## 2021-01-01 RX ORDER — MAGNESIUM SULFATE HEPTAHYDRATE 40 MG/ML
2 INJECTION, SOLUTION INTRAVENOUS ONCE
Status: COMPLETED | OUTPATIENT
Start: 2021-01-01 | End: 2021-01-01

## 2021-01-01 RX ORDER — SODIUM CHLORIDE, SODIUM LACTATE, POTASSIUM CHLORIDE, CALCIUM CHLORIDE 600; 310; 30; 20 MG/100ML; MG/100ML; MG/100ML; MG/100ML
INJECTION, SOLUTION INTRAVENOUS CONTINUOUS
Status: DISCONTINUED | OUTPATIENT
Start: 2021-01-01 | End: 2021-01-01 | Stop reason: HOSPADM

## 2021-01-01 RX ORDER — FENTANYL CITRATE 50 UG/ML
INJECTION, SOLUTION INTRAMUSCULAR; INTRAVENOUS PRN
Status: DISCONTINUED | OUTPATIENT
Start: 2021-01-01 | End: 2021-01-01

## 2021-01-01 RX ORDER — NALOXONE HYDROCHLORIDE 0.4 MG/ML
0.4 INJECTION, SOLUTION INTRAMUSCULAR; INTRAVENOUS; SUBCUTANEOUS
Status: DISCONTINUED | OUTPATIENT
Start: 2021-01-01 | End: 2022-01-01 | Stop reason: HOSPADM

## 2021-01-01 RX ORDER — DEXTROSE MONOHYDRATE 25 G/50ML
25-50 INJECTION, SOLUTION INTRAVENOUS
Status: DISCONTINUED | OUTPATIENT
Start: 2021-01-01 | End: 2022-01-01 | Stop reason: HOSPADM

## 2021-01-01 RX ORDER — NALOXONE HYDROCHLORIDE 0.4 MG/ML
0.2 INJECTION, SOLUTION INTRAMUSCULAR; INTRAVENOUS; SUBCUTANEOUS
Status: DISCONTINUED | OUTPATIENT
Start: 2021-01-01 | End: 2022-01-01 | Stop reason: HOSPADM

## 2021-01-01 RX ORDER — TRAZODONE HYDROCHLORIDE 50 MG/1
TABLET, FILM COATED ORAL
COMMUNITY
Start: 2021-01-01

## 2021-01-01 RX ORDER — LIDOCAINE HYDROCHLORIDE 20 MG/ML
INJECTION, SOLUTION INFILTRATION; PERINEURAL PRN
Status: DISCONTINUED | OUTPATIENT
Start: 2021-01-01 | End: 2021-01-01

## 2021-01-01 RX ORDER — ONDANSETRON 4 MG/1
4 TABLET, ORALLY DISINTEGRATING ORAL EVERY 30 MIN PRN
Status: DISCONTINUED | OUTPATIENT
Start: 2021-01-01 | End: 2021-01-01 | Stop reason: HOSPADM

## 2021-01-01 RX ORDER — FUROSEMIDE 10 MG/ML
20 INJECTION INTRAMUSCULAR; INTRAVENOUS ONCE
Status: COMPLETED | OUTPATIENT
Start: 2021-01-01 | End: 2021-01-01

## 2021-01-01 RX ORDER — ONDANSETRON 2 MG/ML
4 INJECTION INTRAMUSCULAR; INTRAVENOUS EVERY 30 MIN PRN
Status: DISCONTINUED | OUTPATIENT
Start: 2021-01-01 | End: 2021-01-01 | Stop reason: HOSPADM

## 2021-01-01 RX ORDER — NALOXONE HYDROCHLORIDE 0.4 MG/ML
0.4 INJECTION, SOLUTION INTRAMUSCULAR; INTRAVENOUS; SUBCUTANEOUS
Status: DISCONTINUED | OUTPATIENT
Start: 2021-01-01 | End: 2021-01-01 | Stop reason: HOSPADM

## 2021-01-01 RX ORDER — NICOTINE POLACRILEX 4 MG
15-30 LOZENGE BUCCAL
Status: DISCONTINUED | OUTPATIENT
Start: 2021-01-01 | End: 2022-01-01 | Stop reason: HOSPADM

## 2021-01-01 RX ORDER — ATROPINE SULFATE 0.1 MG/ML
INJECTION INTRAVENOUS
Status: COMPLETED
Start: 2021-01-01 | End: 2021-01-01

## 2021-01-01 RX ORDER — NALOXONE HYDROCHLORIDE 0.4 MG/ML
0.2 INJECTION, SOLUTION INTRAMUSCULAR; INTRAVENOUS; SUBCUTANEOUS
Status: DISCONTINUED | OUTPATIENT
Start: 2021-01-01 | End: 2021-01-01 | Stop reason: HOSPADM

## 2021-01-01 RX ORDER — NICOTINE POLACRILEX 4 MG
15-30 LOZENGE BUCCAL
Status: DISCONTINUED | OUTPATIENT
Start: 2021-01-01 | End: 2021-01-01

## 2021-01-01 RX ORDER — FENTANYL CITRATE 50 UG/ML
INJECTION, SOLUTION INTRAMUSCULAR; INTRAVENOUS
Status: COMPLETED
Start: 2021-01-01 | End: 2021-01-01

## 2021-01-01 RX ORDER — LIDOCAINE 40 MG/G
CREAM TOPICAL
Status: DISCONTINUED | OUTPATIENT
Start: 2021-01-01 | End: 2021-01-01 | Stop reason: HOSPADM

## 2021-01-01 RX ORDER — LORAZEPAM 2 MG/ML
4 INJECTION INTRAMUSCULAR
Status: COMPLETED | OUTPATIENT
Start: 2021-01-01 | End: 2021-01-01

## 2021-01-01 RX ORDER — LORAZEPAM 2 MG/ML
INJECTION INTRAMUSCULAR
Status: COMPLETED
Start: 2021-01-01 | End: 2021-01-01

## 2021-01-01 RX ORDER — LORAZEPAM 2 MG/ML
INJECTION INTRAMUSCULAR
Status: DISCONTINUED
Start: 2021-01-01 | End: 2021-01-01 | Stop reason: HOSPADM

## 2021-01-01 RX ORDER — LORAZEPAM 0.5 MG/1
0.5 TABLET ORAL DAILY PRN
COMMUNITY
Start: 2021-01-01

## 2021-01-01 RX ORDER — ACETAMINOPHEN 325 MG/1
650 TABLET ORAL EVERY 4 HOURS PRN
Status: DISCONTINUED | OUTPATIENT
Start: 2021-01-01 | End: 2022-01-01 | Stop reason: HOSPADM

## 2021-01-01 RX ORDER — LEVOFLOXACIN 5 MG/ML
INJECTION, SOLUTION INTRAVENOUS PRN
Status: DISCONTINUED | OUTPATIENT
Start: 2021-01-01 | End: 2021-01-01

## 2021-01-01 RX ORDER — NORTRIPTYLINE HCL 25 MG
50 CAPSULE ORAL AT BEDTIME
COMMUNITY
Start: 2021-01-01

## 2021-01-01 RX ORDER — POTASSIUM CHLORIDE 1.5 G/1.58G
40 POWDER, FOR SOLUTION ORAL ONCE
Status: DISCONTINUED | OUTPATIENT
Start: 2021-01-01 | End: 2021-01-01

## 2021-01-01 RX ORDER — AMOXICILLIN 250 MG
2 CAPSULE ORAL 2 TIMES DAILY PRN
Status: DISCONTINUED | OUTPATIENT
Start: 2021-01-01 | End: 2022-01-01 | Stop reason: HOSPADM

## 2021-01-01 RX ORDER — IOPAMIDOL 510 MG/ML
INJECTION, SOLUTION INTRAVASCULAR PRN
Status: DISCONTINUED | OUTPATIENT
Start: 2021-01-01 | End: 2021-01-01 | Stop reason: HOSPADM

## 2021-01-01 RX ORDER — HEPARIN SODIUM 5000 [USP'U]/.5ML
5000 INJECTION, SOLUTION INTRAVENOUS; SUBCUTANEOUS EVERY 8 HOURS
Status: DISCONTINUED | OUTPATIENT
Start: 2021-01-01 | End: 2022-01-01

## 2021-01-01 RX ORDER — DOPAMINE HYDROCHLORIDE 160 MG/100ML
2-20 INJECTION, SOLUTION INTRAVENOUS CONTINUOUS
Status: DISCONTINUED | OUTPATIENT
Start: 2021-01-01 | End: 2021-01-01

## 2021-01-01 RX ORDER — DOPAMINE HYDROCHLORIDE 160 MG/100ML
INJECTION, SOLUTION INTRAVENOUS
Status: COMPLETED
Start: 2021-01-01 | End: 2021-01-01

## 2021-01-01 RX ORDER — HYDROMORPHONE HYDROCHLORIDE 1 MG/ML
.3-.5 INJECTION, SOLUTION INTRAMUSCULAR; INTRAVENOUS; SUBCUTANEOUS EVERY 5 MIN PRN
Status: DISCONTINUED | OUTPATIENT
Start: 2021-01-01 | End: 2021-01-01 | Stop reason: HOSPADM

## 2021-01-01 RX ORDER — ATROPINE SULFATE 0.1 MG/ML
0.5 INJECTION INTRAVENOUS ONCE
Status: COMPLETED | OUTPATIENT
Start: 2021-01-01 | End: 2021-01-01

## 2021-01-01 RX ORDER — FENTANYL CITRATE 50 UG/ML
25 INJECTION, SOLUTION INTRAMUSCULAR; INTRAVENOUS
Status: DISCONTINUED | OUTPATIENT
Start: 2021-01-01 | End: 2021-01-01 | Stop reason: HOSPADM

## 2021-01-01 RX ORDER — MIDAZOLAM HCL IN 0.9 % NACL/PF 1 MG/ML
1-8 PLASTIC BAG, INJECTION (ML) INTRAVENOUS CONTINUOUS
Status: DISCONTINUED | OUTPATIENT
Start: 2021-01-01 | End: 2022-01-01

## 2021-01-01 RX ORDER — EPHEDRINE SULFATE 50 MG/ML
INJECTION, SOLUTION INTRAMUSCULAR; INTRAVENOUS; SUBCUTANEOUS PRN
Status: DISCONTINUED | OUTPATIENT
Start: 2021-01-01 | End: 2021-01-01

## 2021-01-01 RX ORDER — PROPOFOL 10 MG/ML
INJECTION, EMULSION INTRAVENOUS PRN
Status: DISCONTINUED | OUTPATIENT
Start: 2021-01-01 | End: 2021-01-01

## 2021-01-01 RX ORDER — FLUMAZENIL 0.1 MG/ML
0.2 INJECTION, SOLUTION INTRAVENOUS
Status: DISCONTINUED | OUTPATIENT
Start: 2021-01-01 | End: 2021-01-01 | Stop reason: HOSPADM

## 2021-01-01 RX ORDER — SODIUM CHLORIDE 9 MG/ML
INJECTION, SOLUTION INTRAVENOUS CONTINUOUS
Status: CANCELLED | OUTPATIENT
Start: 2021-01-01

## 2021-01-01 RX ORDER — POTASSIUM CHLORIDE 29.8 MG/ML
20 INJECTION INTRAVENOUS
Status: COMPLETED | OUTPATIENT
Start: 2021-01-01 | End: 2021-01-01

## 2021-01-01 RX ORDER — ONDANSETRON 2 MG/ML
INJECTION INTRAMUSCULAR; INTRAVENOUS PRN
Status: DISCONTINUED | OUTPATIENT
Start: 2021-01-01 | End: 2021-01-01

## 2021-01-01 RX ORDER — EPINEPHRINE 1 MG/ML
INJECTION, SOLUTION, CONCENTRATE INTRAVENOUS PRN
Status: DISCONTINUED | OUTPATIENT
Start: 2021-01-01 | End: 2021-01-01 | Stop reason: HOSPADM

## 2021-01-01 RX ORDER — DEXTROSE MONOHYDRATE 100 MG/ML
INJECTION, SOLUTION INTRAVENOUS CONTINUOUS PRN
Status: DISCONTINUED | OUTPATIENT
Start: 2021-01-01 | End: 2022-01-01

## 2021-01-01 RX ORDER — PROPOFOL 10 MG/ML
5-75 INJECTION, EMULSION INTRAVENOUS CONTINUOUS
Status: DISCONTINUED | OUTPATIENT
Start: 2021-01-01 | End: 2022-01-01

## 2021-01-01 RX ORDER — DEXTROSE MONOHYDRATE 25 G/50ML
25-50 INJECTION, SOLUTION INTRAVENOUS
Status: DISCONTINUED | OUTPATIENT
Start: 2021-01-01 | End: 2021-01-01

## 2021-01-01 RX ORDER — GABAPENTIN 300 MG/1
CAPSULE ORAL
COMMUNITY
Start: 2021-01-01

## 2021-01-01 RX ORDER — POLYETHYLENE GLYCOL 3350 17 G/17G
17 POWDER, FOR SOLUTION ORAL DAILY
Status: DISCONTINUED | OUTPATIENT
Start: 2021-01-01 | End: 2022-01-01

## 2021-01-01 RX ORDER — PROPOFOL 10 MG/ML
5-75 INJECTION, EMULSION INTRAVENOUS CONTINUOUS
Status: DISCONTINUED | OUTPATIENT
Start: 2021-01-01 | End: 2021-01-01 | Stop reason: HOSPADM

## 2021-01-01 RX ORDER — INDOMETHACIN 50 MG/1
100 SUPPOSITORY RECTAL
Status: COMPLETED | OUTPATIENT
Start: 2021-01-01 | End: 2021-01-01

## 2021-01-01 RX ORDER — FENTANYL CITRATE 50 UG/ML
25-50 INJECTION, SOLUTION INTRAMUSCULAR; INTRAVENOUS
Status: DISCONTINUED | OUTPATIENT
Start: 2021-01-01 | End: 2021-01-01 | Stop reason: HOSPADM

## 2021-01-01 RX ORDER — DEXAMETHASONE SODIUM PHOSPHATE 4 MG/ML
INJECTION, SOLUTION INTRA-ARTICULAR; INTRALESIONAL; INTRAMUSCULAR; INTRAVENOUS; SOFT TISSUE PRN
Status: DISCONTINUED | OUTPATIENT
Start: 2021-01-01 | End: 2021-01-01

## 2021-01-01 RX ORDER — HYDRALAZINE HYDROCHLORIDE 20 MG/ML
2.5-5 INJECTION INTRAMUSCULAR; INTRAVENOUS EVERY 10 MIN PRN
Status: DISCONTINUED | OUTPATIENT
Start: 2021-01-01 | End: 2021-01-01 | Stop reason: HOSPADM

## 2021-01-01 RX ORDER — HEPARIN SODIUM 5000 [USP'U]/.5ML
5000 INJECTION, SOLUTION INTRAVENOUS; SUBCUTANEOUS EVERY 12 HOURS
Status: CANCELLED | OUTPATIENT
Start: 2021-01-01

## 2021-01-01 RX ORDER — ATENOLOL 50 MG/1
50 TABLET ORAL DAILY
COMMUNITY
Start: 2021-01-01

## 2021-01-01 RX ORDER — VECURONIUM BROMIDE 1 MG/ML
5 INJECTION, POWDER, LYOPHILIZED, FOR SOLUTION INTRAVENOUS ONCE
Status: COMPLETED | OUTPATIENT
Start: 2021-01-01 | End: 2021-01-01

## 2021-01-01 RX ORDER — IPRATROPIUM BROMIDE AND ALBUTEROL SULFATE 2.5; .5 MG/3ML; MG/3ML
3 SOLUTION RESPIRATORY (INHALATION) EVERY 4 HOURS
Status: DISCONTINUED | OUTPATIENT
Start: 2021-01-01 | End: 2022-01-01 | Stop reason: HOSPADM

## 2021-01-01 RX ORDER — AMOXICILLIN 250 MG
1 CAPSULE ORAL AT BEDTIME
Status: DISCONTINUED | OUTPATIENT
Start: 2021-01-01 | End: 2022-01-01

## 2021-01-01 RX ORDER — POLYETHYLENE GLYCOL 3350 17 G/17G
17 POWDER, FOR SOLUTION ORAL DAILY PRN
Status: DISCONTINUED | OUTPATIENT
Start: 2021-01-01 | End: 2022-01-01 | Stop reason: HOSPADM

## 2021-01-01 RX ADMIN — IPRATROPIUM BROMIDE AND ALBUTEROL SULFATE 3 ML: 2.5; .5 SOLUTION RESPIRATORY (INHALATION) at 19:53

## 2021-01-01 RX ADMIN — FENTANYL CITRATE 25 MCG: 50 INJECTION, SOLUTION INTRAMUSCULAR; INTRAVENOUS at 22:28

## 2021-01-01 RX ADMIN — EPINEPHRINE 0.04 MCG/KG/MIN: 1 INJECTION PARENTERAL at 04:04

## 2021-01-01 RX ADMIN — SODIUM CHLORIDE, POTASSIUM CHLORIDE, SODIUM LACTATE AND CALCIUM CHLORIDE: 600; 310; 30; 20 INJECTION, SOLUTION INTRAVENOUS at 12:31

## 2021-01-01 RX ADMIN — POTASSIUM CHLORIDE 20 MEQ: 29.8 INJECTION, SOLUTION INTRAVENOUS at 17:05

## 2021-01-01 RX ADMIN — NICARDIPINE HYDROCHLORIDE 15 MG/HR: 0.2 INJECTION, SOLUTION INTRAVENOUS at 12:40

## 2021-01-01 RX ADMIN — NICARDIPINE HYDROCHLORIDE 2.5 MG/HR: 0.2 INJECTION, SOLUTION INTRAVENOUS at 14:41

## 2021-01-01 RX ADMIN — LIDOCAINE HYDROCHLORIDE 100 MG: 20 INJECTION, SOLUTION INFILTRATION; PERINEURAL at 12:31

## 2021-01-01 RX ADMIN — LORAZEPAM 4 MG: 2 INJECTION INTRAMUSCULAR; INTRAVENOUS at 02:58

## 2021-01-01 RX ADMIN — IPRATROPIUM BROMIDE AND ALBUTEROL SULFATE 3 ML: 2.5; .5 SOLUTION RESPIRATORY (INHALATION) at 19:38

## 2021-01-01 RX ADMIN — FENTANYL CITRATE 50 MCG: 50 INJECTION, SOLUTION INTRAMUSCULAR; INTRAVENOUS at 12:28

## 2021-01-01 RX ADMIN — PROPOFOL 100 MG: 10 INJECTION, EMULSION INTRAVENOUS at 12:31

## 2021-01-01 RX ADMIN — PROPOFOL 50 MCG/KG/MIN: 10 INJECTION, EMULSION INTRAVENOUS at 08:18

## 2021-01-01 RX ADMIN — PANTOPRAZOLE SODIUM 40 MG: 40 INJECTION, POWDER, FOR SOLUTION INTRAVENOUS at 09:29

## 2021-01-01 RX ADMIN — IPRATROPIUM BROMIDE AND ALBUTEROL SULFATE 3 ML: 2.5; .5 SOLUTION RESPIRATORY (INHALATION) at 04:17

## 2021-01-01 RX ADMIN — IPRATROPIUM BROMIDE AND ALBUTEROL SULFATE 3 ML: 2.5; .5 SOLUTION RESPIRATORY (INHALATION) at 05:21

## 2021-01-01 RX ADMIN — PROPOFOL 20 MCG/KG/MIN: 10 INJECTION, EMULSION INTRAVENOUS at 02:59

## 2021-01-01 RX ADMIN — ATROPINE SULFATE 0.5 MG: 0.1 INJECTION, SOLUTION ENDOTRACHEAL; INTRAMUSCULAR; INTRAVENOUS; SUBCUTANEOUS at 05:38

## 2021-01-01 RX ADMIN — DOPAMINE HYDROCHLORIDE 5 MCG/KG/MIN: 160 INJECTION, SOLUTION INTRAVENOUS at 05:56

## 2021-01-01 RX ADMIN — LORAZEPAM 4 MG: 2 INJECTION INTRAMUSCULAR; INTRAVENOUS at 12:05

## 2021-01-01 RX ADMIN — ATROPINE SULFATE 0.5 MG: 0.1 INJECTION INTRAVENOUS at 05:29

## 2021-01-01 RX ADMIN — LORAZEPAM 4 MG: 2 INJECTION INTRAMUSCULAR; INTRAVENOUS at 02:50

## 2021-01-01 RX ADMIN — NICARDIPINE HYDROCHLORIDE 10 MG/HR: 0.2 INJECTION, SOLUTION INTRAVENOUS at 20:28

## 2021-01-01 RX ADMIN — ONDANSETRON 4 MG: 2 INJECTION INTRAMUSCULAR; INTRAVENOUS at 12:52

## 2021-01-01 RX ADMIN — NICARDIPINE HYDROCHLORIDE 10 MG/HR: 0.2 INJECTION, SOLUTION INTRAVENOUS at 18:05

## 2021-01-01 RX ADMIN — HEPARIN SODIUM 5000 UNITS: 5000 INJECTION, SOLUTION INTRAVENOUS; SUBCUTANEOUS at 15:58

## 2021-01-01 RX ADMIN — HYDRALAZINE HYDROCHLORIDE 5 MG: 20 INJECTION INTRAMUSCULAR; INTRAVENOUS at 14:04

## 2021-01-01 RX ADMIN — SUGAMMADEX 200 MG: 100 INJECTION, SOLUTION INTRAVENOUS at 13:10

## 2021-01-01 RX ADMIN — FUROSEMIDE 20 MG: 10 INJECTION, SOLUTION INTRAMUSCULAR; INTRAVENOUS at 13:03

## 2021-01-01 RX ADMIN — PROPOFOL 50 MCG/KG/MIN: 10 INJECTION, EMULSION INTRAVENOUS at 05:35

## 2021-01-01 RX ADMIN — FENTANYL CITRATE 50 MCG/HR: 50 INJECTION, SOLUTION INTRAMUSCULAR; INTRAVENOUS at 22:42

## 2021-01-01 RX ADMIN — ROCURONIUM BROMIDE 50 MG: 10 INJECTION INTRAVENOUS at 12:31

## 2021-01-01 RX ADMIN — POLYETHYLENE GLYCOL 3350 17 G: 17 POWDER, FOR SOLUTION ORAL at 14:13

## 2021-01-01 RX ADMIN — Medication 1 MG/HR: at 03:14

## 2021-01-01 RX ADMIN — CEFEPIME HYDROCHLORIDE 2 G: 2 INJECTION, POWDER, FOR SOLUTION INTRAVENOUS at 05:11

## 2021-01-01 RX ADMIN — LORAZEPAM 4 MG: 2 INJECTION INTRAMUSCULAR; INTRAVENOUS at 12:00

## 2021-01-01 RX ADMIN — FENTANYL CITRATE 100 MCG/HR: 50 INJECTION, SOLUTION INTRAMUSCULAR; INTRAVENOUS at 14:04

## 2021-01-01 RX ADMIN — PROPOFOL 50 MCG/KG/MIN: 10 INJECTION, EMULSION INTRAVENOUS at 03:45

## 2021-01-01 RX ADMIN — FENTANYL CITRATE 200 MCG/HR: 50 INJECTION, SOLUTION INTRAMUSCULAR; INTRAVENOUS at 03:44

## 2021-01-01 RX ADMIN — DEXAMETHASONE SODIUM PHOSPHATE 6 MG: 4 INJECTION, SOLUTION INTRA-ARTICULAR; INTRALESIONAL; INTRAMUSCULAR; INTRAVENOUS; SOFT TISSUE at 12:31

## 2021-01-01 RX ADMIN — POLYETHYLENE GLYCOL 3350 17 G: 17 POWDER, FOR SOLUTION ORAL at 20:21

## 2021-01-01 RX ADMIN — HUMAN ALBUMIN MICROSPHERES AND PERFLUTREN 6 ML: 10; .22 INJECTION, SOLUTION INTRAVENOUS at 10:14

## 2021-01-01 RX ADMIN — MAGNESIUM SULFATE IN WATER 2 G: 40 INJECTION, SOLUTION INTRAVENOUS at 18:59

## 2021-01-01 RX ADMIN — IPRATROPIUM BROMIDE AND ALBUTEROL SULFATE 3 ML: 2.5; .5 SOLUTION RESPIRATORY (INHALATION) at 11:25

## 2021-01-01 RX ADMIN — HYDRALAZINE HYDROCHLORIDE 5 MG: 20 INJECTION INTRAMUSCULAR; INTRAVENOUS at 13:44

## 2021-01-01 RX ADMIN — LORAZEPAM 4 MG: 2 INJECTION INTRAMUSCULAR at 02:50

## 2021-01-01 RX ADMIN — Medication 50 MCG/HR: at 22:35

## 2021-01-01 RX ADMIN — FENTANYL CITRATE 25 MCG: 50 INJECTION INTRAMUSCULAR; INTRAVENOUS at 22:28

## 2021-01-01 RX ADMIN — IPRATROPIUM BROMIDE AND ALBUTEROL SULFATE 3 ML: 2.5; .5 SOLUTION RESPIRATORY (INHALATION) at 15:53

## 2021-01-01 RX ADMIN — PANTOPRAZOLE SODIUM 40 MG: 40 INJECTION, POWDER, FOR SOLUTION INTRAVENOUS at 07:45

## 2021-01-01 RX ADMIN — IPRATROPIUM BROMIDE AND ALBUTEROL SULFATE 3 ML: 2.5; .5 SOLUTION RESPIRATORY (INHALATION) at 08:03

## 2021-01-01 RX ADMIN — POTASSIUM CHLORIDE 20 MEQ: 29.8 INJECTION, SOLUTION INTRAVENOUS at 18:07

## 2021-01-01 RX ADMIN — IPRATROPIUM BROMIDE AND ALBUTEROL SULFATE 3 ML: 2.5; .5 SOLUTION RESPIRATORY (INHALATION) at 00:10

## 2021-01-01 RX ADMIN — LEVOFLOXACIN 500 MG: 5 INJECTION, SOLUTION INTRAVENOUS at 12:45

## 2021-01-01 RX ADMIN — IPRATROPIUM BROMIDE AND ALBUTEROL SULFATE 3 ML: 2.5; .5 SOLUTION RESPIRATORY (INHALATION) at 08:42

## 2021-01-01 RX ADMIN — ATROPINE SULFATE 0.5 MG: 0.1 INJECTION INTRAVENOUS at 05:38

## 2021-01-01 RX ADMIN — Medication 5 MG: at 12:31

## 2021-01-01 RX ADMIN — IPRATROPIUM BROMIDE AND ALBUTEROL SULFATE 3 ML: 2.5; .5 SOLUTION RESPIRATORY (INHALATION) at 15:13

## 2021-01-01 RX ADMIN — HYDRALAZINE HYDROCHLORIDE 5 MG: 20 INJECTION INTRAMUSCULAR; INTRAVENOUS at 14:14

## 2021-01-01 RX ADMIN — VECURONIUM BROMIDE 5 MG: 1 INJECTION, POWDER, LYOPHILIZED, FOR SOLUTION INTRAVENOUS at 17:44

## 2021-01-01 RX ADMIN — EPINEPHRINE 0.1 MCG/KG/MIN: 1 INJECTION INTRAMUSCULAR; INTRAVENOUS; SUBCUTANEOUS at 22:20

## 2021-01-01 RX ADMIN — PROPOFOL 20 MCG/KG/MIN: 10 INJECTION, EMULSION INTRAVENOUS at 23:12

## 2021-01-01 RX ADMIN — VANCOMYCIN HYDROCHLORIDE 1500 MG: 100 INJECTION, POWDER, LYOPHILIZED, FOR SOLUTION INTRAVENOUS at 05:53

## 2021-01-01 RX ADMIN — PROPOFOL 10 MCG/KG/MIN: 10 INJECTION, EMULSION INTRAVENOUS at 20:06

## 2021-01-01 RX ADMIN — SODIUM CHLORIDE, POTASSIUM CHLORIDE, SODIUM LACTATE AND CALCIUM CHLORIDE 1000 ML: 600; 310; 30; 20 INJECTION, SOLUTION INTRAVENOUS at 22:49

## 2021-01-01 RX ADMIN — VANCOMYCIN HYDROCHLORIDE 1500 MG: 100 INJECTION, POWDER, LYOPHILIZED, FOR SOLUTION INTRAVENOUS at 05:54

## 2021-01-01 RX ADMIN — SODIUM CHLORIDE, POTASSIUM CHLORIDE, SODIUM LACTATE AND CALCIUM CHLORIDE 250 ML: 600; 310; 30; 20 INJECTION, SOLUTION INTRAVENOUS at 16:04

## 2021-01-01 RX ADMIN — CEFEPIME HYDROCHLORIDE 2 G: 2 INJECTION, POWDER, FOR SOLUTION INTRAVENOUS at 04:08

## 2021-01-01 RX ADMIN — POTASSIUM PHOSPHATE, MONOBASIC AND POTASSIUM PHOSPHATE, DIBASIC 9 MMOL: 224; 236 INJECTION, SOLUTION, CONCENTRATE INTRAVENOUS at 17:56

## 2021-01-01 ASSESSMENT — ACTIVITIES OF DAILY LIVING (ADL)
ADLS_ACUITY_SCORE: 9
ADLS_ACUITY_SCORE: 9
ADLS_ACUITY_SCORE: 11
ADLS_ACUITY_SCORE: 11
ADLS_ACUITY_SCORE: 9
ADLS_ACUITY_SCORE: 9
ADLS_ACUITY_SCORE: 11
ADLS_ACUITY_SCORE: 9
ADLS_ACUITY_SCORE: 12
ADLS_ACUITY_SCORE: 9
ADLS_ACUITY_SCORE: 9
ADLS_ACUITY_SCORE: 11
ADLS_ACUITY_SCORE: 9
ADLS_ACUITY_SCORE: 9
ADLS_ACUITY_SCORE: 11
ADLS_ACUITY_SCORE: 9
ADLS_ACUITY_SCORE: 11
ADLS_ACUITY_SCORE: 9
ADLS_ACUITY_SCORE: 11
ADLS_ACUITY_SCORE: 9
ADLS_ACUITY_SCORE: 11
ADLS_ACUITY_SCORE: 9
ADLS_ACUITY_SCORE: 11
ADLS_ACUITY_SCORE: 9
ADLS_ACUITY_SCORE: 9
ADLS_ACUITY_SCORE: 11
ADLS_ACUITY_SCORE: 9
ADLS_ACUITY_SCORE: 9
ADLS_ACUITY_SCORE: 11
ADLS_ACUITY_SCORE: 11
ADLS_ACUITY_SCORE: 9
ADLS_ACUITY_SCORE: 12
ADLS_ACUITY_SCORE: 12
ADLS_ACUITY_SCORE: 9

## 2021-01-01 ASSESSMENT — MIFFLIN-ST. JEOR
SCORE: 1482.5
SCORE: 1539.5
SCORE: 1596.31
SCORE: 1597.31

## 2021-01-01 ASSESSMENT — LIFESTYLE VARIABLES: TOBACCO_USE: 1

## 2021-01-01 ASSESSMENT — COPD QUESTIONNAIRES: COPD: 1

## 2021-04-13 PROBLEM — K80.50 CHOLEDOCHOLITHIASIS: Status: ACTIVE | Noted: 2021-01-01

## 2021-04-13 NOTE — TELEPHONE ENCOUNTER
Health Call Center    Phone Message    May a detailed message be left on voicemail: yes     Reason for Call: Other: Mikel at [facility] is calling asking about getting the patient scheduled for an ERCP. She states that the patient is currently admitted and there could be a possibility that someone will call the patient's home phone. Please call [phone] as soon as possible to discuss scheduling.    Action Taken: Message routed to:  [facility] ([facility]    Travel Screening: Not Applicable

## 2021-04-13 NOTE — TELEPHONE ENCOUNTER
Pt recently admitted at OSH, needs outpatient ERCP. Per Dr Escudero:    Could you please:   1)  Contact pt in am and coordinate ERCP, presumably Thursday with [facility]. I confirmed phone number and address correct.   2) Instruct pt to come to our ED if develops recurrent pain or fevers.   3) Presuming ERCP not being done tomorrow (open slot I'm not aware of), then have her start full liquid diet and ADAT.     Please assist in scheduling:     Procedure/Imaging/Clinic: ERCP  Physician: Dr. Paz  Timin/15/21   Procedure length:90 min  Anesthesia:general  Dx: Choledocholithiasis  Tier:2  Location: [facility]     Called to discuss with patient- left message. Case Request placed.    Pt had negative covid test on  and we can use admit H&P from OSH, records in CareEverywhere. Requested CT and MRCP pushed.     ML

## 2021-04-13 NOTE — TELEPHONE ENCOUNTER
Called by Dr. LINDSEY Anne at [facility] pt admitted this am with severe upper abdominal pain which has now resolved. LFTs increased with bili 1.8 and MRCP showed 6 mm CBD stone.    Afebrile, normal WBC. Per discussion referring MD felt pt would be appropriate for discharge if ERCP could be coordinated expeditiously. There are currently no rooms available for transfer at [facility] or [facility].    Discussed with ERCP colleagues.     Our office will contact pt in am to coordinate outpt ERCP on Thursday. If pain recurs or fever develops will come to our ED.    RAUDEL Escudero MD  Professor of Medicine  Division of Gastroenterology, Hepatology and Nutrition  [facility]

## 2021-04-13 NOTE — TELEPHONE ENCOUNTER
Returned call to [facility], reviewed plan with staff there. Added info to demographics for family members. Pt aware plan for ERCP on Thursday or Friday of this week, will call with more info once determined. Advised to come to [facility] with recurrent pain, fever/chills.    ML

## 2021-04-14 NOTE — TELEPHONE ENCOUNTER
Called patient, confirmed 9:15am arrival, 11:15 am procedure time. Address provided, reviewed NPO guidelines.    ANASTASIA

## 2021-04-14 NOTE — TELEPHONE ENCOUNTER
Called patient/spouse to confirm patient will come for procedure tomorrow, time TBD, will call with more details.Per spouse patient doing ok, at home, tolerating liquid diet. Slept great last night. Reassured I would call with additional timing info once known.    Lyly Snyder, RN Care Coordinator

## 2021-04-15 NOTE — BRIEF OP NOTE
[facility] Brief Operative Note    Pre-operative diagnosis: Choledocholithiasis [K80.50]   Post-operative diagnosis Choledocholithiasis   Procedure: Procedure(s):  ENDOSCOPIC RETROGRADE CHOLANGIOPANCREATOGRAPHY with biliary sphincterotomy and stone removal   Surgeon: Marshall Paz MD   Assistants(s): Darian Guillory MD   Estimated blood loss: None    Specimens: None       Findings:  - Normal major papilla.  - Cholangiogram w/ a single filling defect (stone) in a non-dilated common bile duct.  - Biliary endoscopic sphincterotomy performed.  - Extraction of a single pigmented stone, and dark bile, during balloon sweeps.  - Repeat cholangiogram with complete clearance of biliary tree.    Recommendations:  - Observe in PACU and discharge home.  - No anticoagulation for 72-hours. Recommend holding baby aspirin for 3-days.  - No GI follow-up or interval imaging necessary.  - Will refer patient for interval laparoscopic cholecystectomy at [facility].    Darian Guillory MD on 4/15/2021 at 1:51 PM

## 2021-04-15 NOTE — ANESTHESIA PROCEDURE NOTES
Airway       Patient location during procedure: OR  Staff -        CRNA: Adamaris Huitron APRN CRNA       Performed By: CRNA  Consent for Airway        Urgency: elective  Indications and Patient Condition       Indications for airway management: chikis-procedural       Induction type:intravenous       Mask difficulty assessment: 2 - vent by mask + OA or adjuvant +/- NMBA    Final Airway Details       Final airway type: endotracheal airway       Successful airway: ETT - single  Endotracheal Airway Details        ETT size (mm): 6.5       Cuffed: yes       Successful intubation technique: direct laryngoscopy       DL Blade Type: Ramirez 2       Grade View of Cords: 1       Adjucts: stylet and tooth guard       Position: Right       Measured from: gums/teeth       Secured at (cm): 22       Bite block used: None    Post intubation assessment        Placement verified by: capnometry and equal breath sounds        Number of attempts at approach: 1       Number of other approaches attempted: 0       Secured with: silk tape       Ease of procedure: easy       Dentition: Intact    Medication(s) Administered   Medication Administration Time: 4/15/2021 12:34 PM

## 2021-04-15 NOTE — ANESTHESIA CARE TRANSFER NOTE
Patient: Barbara Nelson    Procedure(s):  ENDOSCOPIC RETROGRADE CHOLANGIOPANCREATOGRAPHY with biliary sphincterotomy and stone removal    Diagnosis: Choledocholithiasis [K80.50]  Diagnosis Additional Information: No value filed.    Anesthesia Type:   General     Note:    Oropharynx: oropharynx clear of all foreign objects and spontaneously breathing  Level of Consciousness: awake  Oxygen Supplementation: nasal cannula  Level of Supplemental Oxygen (L/min / FiO2): 2  Independent Airway: airway patency satisfactory and stable  Dentition: dentition unchanged  Vital Signs Stable: post-procedure vital signs reviewed and stable  Report to RN Given: handoff report given  Patient transferred to: PACU    Handoff Report: Identifed the Patient, Identified the Reponsible Provider, Reviewed the pertinent medical history, Discussed the surgical course, Reviewed Intra-OP anesthesia mangement and issues during anesthesia, Set expectations for post-procedure period and Allowed opportunity for questions and acknowledgement of understanding      Vitals: (Last set prior to Anesthesia Care Transfer)  CRNA VITALS  4/15/2021 1253 - 4/15/2021 1328      4/15/2021             NIBP:  172/61    Pulse:  72    SpO2:  96 %    Resp Rate (observed):  16        Electronically Signed By: CRISTELA Avila CRNA  April 15, 2021  1:28 PM

## 2021-04-15 NOTE — ANESTHESIA POSTPROCEDURE EVALUATION
Patient: Barbara Nelson    Procedure(s):  ENDOSCOPIC RETROGRADE CHOLANGIOPANCREATOGRAPHY with biliary sphincterotomy and stone removal    Diagnosis:Choledocholithiasis [K80.50]  Diagnosis Additional Information: No value filed.    Anesthesia Type:  General    Note:  Disposition: Outpatient   Postop Pain Control: Uneventful            Sign Out: Well controlled pain   PONV: No   Neuro/Psych: Uneventful            Sign Out: Acceptable/Baseline neuro status   Airway/Respiratory: Uneventful            Sign Out: Acceptable/Baseline resp. status   CV/Hemodynamics: Uneventful            Sign Out: Acceptable CV status   Other NRE: NONE   DID A NON-ROUTINE EVENT OCCUR? No         Last vitals:  Vitals:    04/15/21 1345 04/15/21 1400 04/15/21 1415   BP: (!) 168/73 (!) 160/54 (!) 165/57   Pulse: 66 67 71   Resp: 14 16 14   Temp: 36.7  C (98.1  F) 36.5  C (97.7  F) 36.5  C (97.7  F)   SpO2: 95% 95% 93%       Last vitals prior to Anesthesia Care Transfer:  CRNA VITALS  4/15/2021 1253 - 4/15/2021 1353      4/15/2021             NIBP:  172/61    Pulse:  72    SpO2:  96 %    Resp Rate (observed):  16          Electronically Signed By: Fredis Cuevas MD  April 15, 2021  2:55 PM

## 2021-04-15 NOTE — OR NURSING
Dr. Guillory at bedside to assess patient, states he does not need to see patient again in phase 2.

## 2021-04-15 NOTE — DISCHARGE INSTRUCTIONS
[facility], [place]  Same-Day Surgery ERCP Procedure   Adult Discharge Orders & Instructions     You had a procedure known as an Endoscopic Retrograde Cholangiopancreatography (ERCP). Your healthcare provider performed the ERCP to look at your bile or pancreatic ducts, and to locate and/or treat blockages (dilation, stenting, removal, etc.) in these ducts. Often biopsies, small samples of tissue, are taken to help diagnose and/or classify stages of disease growth. This procedure is used to diagnose diseases of the pancreas, bile ducts, pancreatic duct, liver, and gallbladder.     After your procedure   1. Make sure to clarify with your healthcare provider any diet restrictions (For example, clear liquid, low fat, no caffeine, etc.)   2. Do NOT take aspirin containing medications or any other blood-thinning medicines (anticoagulants) until your healthcare provider says it's OK. Please hold aspirin for 3 days, resume 4/18.    3. You MAY be prescribed antibiotics, depending on what was done and/or found during your EUS, make sure to take antibiotics as prescribed by your healthcare provider    For 24 hours after surgery  1. Get plenty of rest.  A responsible adult must stay with you for at least 24 hours after you leave the hospital.   2. Do not drive or use heavy equipment.  If you have weakness or tingling, don't drive or use heavy equipment until this feeling goes away.  3. Do not drink alcohol.  4. Avoid strenuous or risky activities (gym, yoga, cycling, etc.).  Ask for help when climbing stairs.   5. You may feel lightheaded.  IF so, sit for a few minutes before standing.  Have someone help you get up.   6. If you have nausea (feel sick to your stomach): Drink only clear liquids such as apple juice, ginger ale, broth or 7-Up.  Rest may also help.  Be sure to drink enough fluids.  Move to a regular diet as you feel able.  7. If you feel bloated or have too much gas, use a heating pad on  your belly to help reduce the discomfort. This should help you feel better.   8. You may have a slight fever. This is normal for the first 24 hours.   9. You may have a dry mouth, a sore throat, muscle aches or trouble sleeping.  These are normal and will go away after 24 hours. A sore throat is most common. Use lozenges or gargle with salt water to ease the discomfort.   10. Do not make important or legal decisions.      Call your doctor for any of the followin. Chest pain, and/or shortness of breath  2. Abdominal  pain, bloating or cramping that has not improved or does not respond to pain reliving medications (Tylenol or narcotics if prescribed)   3. Difficulty swallowing or feeling as though food or liquids are stuck in your throat   4. Sore throat lasting more than 2 days or pain that has worsened over time   5. Black or tarry stools   6. Nausea and/or vomiting that is not resolving or has not responded to anti-nausea medications prescribed to you   7. It has been over 8 to 10 hours since surgery and you are still not able to urinate (pass water)   8. Headache for over 24 hours   9. Fever over 100.5 F (38 C) lasting more than 24 hours after the procedure   10. Signs of jaundice or blockage (fever, chills, abdominal pain, yellowing of the whites of your eyes, yellowing of your skin, and/or passing darker than normal urine)     To contact a doctor, call:   [ ] Dr Paz's clinic at [phone]___ (Monday thru Friday 8:00am to 4:30pm)   [ ] [phone] and ask for the _gasteroenterology__ resident on call (answered 24 hours a day)   [ ] Emergency Department: [facility]: [phone]     Take it easy when you get home.  Remember, same day surgery DOES NOT MEAN SAME DAY RECOVERY!  Healing is a gradual process.  You will need some time to recover - you may be more tired than you realize at first.  Rest and relax for at least the first 24 hours at home.  You'll feel better and heal faster if you take  good care of yourself.

## 2021-04-15 NOTE — ANESTHESIA PREPROCEDURE EVALUATION
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     Thyroid Nodule     Pure hypercholesterolemia       Past Surgical History:   Procedure Laterality Date     ARTHROSCOPY SHOULDER RT/LT      left     COLONOSCOPY  2005     SURGICAL HISTORY OF -   age 8    Tonsils     SURGICAL HISTORY OF -        Breast Bx      SURGICAL HISTORY OF -       Hysterectomy, Bilateral Oophorectomy      Allergies   Allergen Reactions     Lisinopril Other (See Comments)     Comment: cough, Description:      Losartan Other (See Comments)     Comment: dizziness, Description:      Latex Other (See Comments) and Rash     Comment: Itching, Description:         Social History     Tobacco Use     Smoking status: Former Smoker     Packs/day: 0.50     Types: Cigarettes     Quit date: 2006     Years since quitting: 15.2     Smokeless tobacco: Never Used   Substance Use Topics     Alcohol use: Yes     Comment: occasionally      Wt Readings from Last 1 Encounters:   04/15/21 94.9 kg (209 lb 3.5 oz)        Anesthesia Evaluation   Pt has had prior anesthetic. Type: General.    No history of anesthetic complications       ROS/MED HX  ENT/Pulmonary: Comment: Lung ca s/p lobectomy    (+) tobacco use, Past use, COPD,     Neurologic:       Cardiovascular:     (+) hypertension-----    METS/Exercise Tolerance:     Hematologic:       Musculoskeletal:       GI/Hepatic: Comment: Hx acute pancreatitis and choledocholithiasis     (+) GERD,     Renal/Genitourinary:       Endo:       Psychiatric/Substance Use:     (+) psychiatric history anxiety and depression  alcohol abuse     Infectious Disease:       Malignancy:       Other:            Physical Exam    Airway        Mallampati: II   TM distance: > 3 FB   Neck ROM: full   Mouth opening: > 3 cm    Respiratory Devices and Support         Dental  no notable dental history         Cardiovascular   cardiovascular exam normal          Pulmonary   pulmonary exam normal                OUTSIDE LABS:  CBC:   Lab Results   Component Value Date    WBC 12.6 (H) 01/30/2018    WBC 6.5 05/20/2014    HGB 16.3 (H) 01/30/2018    HGB 10.3 (L) 05/20/2014    HCT 50.8 (H) 01/30/2018    HCT 33.0 (L) 05/20/2014     01/30/2018     05/20/2014     BMP:   Lab Results   Component Value Date     01/30/2018     05/20/2014    POTASSIUM 4.5 01/30/2018    POTASSIUM 3.8 05/20/2014    CHLORIDE 98 01/30/2018    CHLORIDE 103 05/20/2014    CO2 27 01/30/2018    CO2 28 05/20/2014    BUN 12 01/30/2018    BUN 12 05/20/2014    CR 1.37 (H) 01/30/2018    CR 0.75 05/20/2014     (H) 01/30/2018    GLC 96 05/20/2014     COAGS:   Lab Results   Component Value Date    PTT 26 01/06/2005    INR 1.08 01/30/2018     POC: No results found for: BGM, HCG, HCGS  HEPATIC:   Lab Results   Component Value Date    ALBUMIN 3.6 01/30/2018    PROTTOTAL 7.7 01/30/2018    ALT 29 01/30/2018    AST 26 01/30/2018    ALKPHOS 92 01/30/2018    BILITOTAL 0.7 01/30/2018     OTHER:   Lab Results   Component Value Date    LACT 2.4 (H) 01/31/2018    MIKE 8.5 01/30/2018    LIPASE 118 08/06/2011    AMYLASE 54 12/03/2005    TSH 0.33 (L) 03/25/2005    T4 0.86 03/09/2005    T3 143 03/25/2005       Anesthesia Plan    ASA Status:  3   NPO Status:  NPO Appropriate    Anesthesia Type: General.     - Airway: ETT   Induction: Intravenous.   Maintenance: Balanced.        Consents    Anesthesia Plan(s) and associated risks, benefits, and realistic alternatives discussed. Questions answered and patient/representative(s) expressed understanding.     - Discussed with:  Patient      -  Extended Intubation/Ventilatory Support Discussed: No.      - Patient is DNR/DNI Status: No    Use of blood products discussed: No .     Postoperative Care    Pain management: IV analgesics, Oral pain medications.   PONV prophylaxis: Ondansetron (or other 5HT-3), Dexamethasone or Solumedrol     Comments:                Fredsi Cuevas MD

## 2021-04-15 NOTE — OR NURSING
Instructions reviewed with spouse Michael over the phone. Responsible adult verbalized understanding of discharge instructions. Paper copy of discharge instructions sent with patient.

## 2021-04-21 NOTE — TELEPHONE ENCOUNTER
Post ERCP (4/15/21) with Dr. Paz: Follow-up    Post procedure recommendations:   No GI follow-up or interval imaging necessary.     - Please refer patient to Dr. Mateo Pruitt     ([email or web address]) for interval laparoscopic  cholecystectomy at [facility].     Orders placed: none, ERCP report routed to Dr Pruitt    Patient states: went to ER on 4/19 with abd pain, feeling better after that. Is watching what she's eating, follow up with PCP and he will help coordinate surgery referral.     Clinic contact and scheduling numbers verified for future questions/concerns.    Lyly Snyder, HERIBERTO Care Coordinator

## 2021-12-29 PROBLEM — G93.40 ACUTE ENCEPHALOPATHY: Status: ACTIVE | Noted: 2018-01-31

## 2021-12-29 PROBLEM — J96.01 ACUTE RESPIRATORY FAILURE WITH HYPOXIA AND HYPERCARBIA (H): Status: ACTIVE | Noted: 2018-01-31

## 2021-12-29 PROBLEM — J96.02 ACUTE RESPIRATORY FAILURE WITH HYPOXIA AND HYPERCARBIA (H): Status: ACTIVE | Noted: 2018-01-31

## 2021-12-29 PROBLEM — N17.9 AKI (ACUTE KIDNEY INJURY) (H): Status: ACTIVE | Noted: 2018-01-31

## 2021-12-29 PROBLEM — A41.9 SEPTIC SHOCK (H): Status: ACTIVE | Noted: 2018-01-31

## 2021-12-29 PROBLEM — K85.90 ACUTE PANCREATITIS: Status: ACTIVE | Noted: 2021-01-01

## 2021-12-29 PROBLEM — M20.40 HAMMERTOE: Status: ACTIVE | Noted: 2021-01-01

## 2021-12-29 PROBLEM — R65.21 SEPTIC SHOCK (H): Status: ACTIVE | Noted: 2018-01-31

## 2021-12-30 PROBLEM — I46.9 CARDIAC ARREST (H): Status: ACTIVE | Noted: 2021-01-01

## 2021-12-30 NOTE — PROCEDURES
Cardiac ICU Procedure Note  CVC Line/ThermoGard Placement   Patient's Name: Barbara Nelson  Service performing procedure: Cardiac ICU    Indication for procedure: Access & TTM  Diagnosis: Cardiogenic shock  Acuity: Emergent  Procedure date: 12/30/21    A Time Out was performed immediately prior to the procedure to confirm correct patient, procedure, and site (site marked if applicable): Yes     Preparation for Procedure:   Hand hygiene performed prior to insertion: yes   Barrier precautions used (large sterile drape, gown, mask, sterile gloves, cap): yes   Skin preparation with chlorhexidine gluconate: yes   Skin preparation agent was allowed to dry: yes   Anesthesia used? Local     CVC line & Thermogard was placed.   Side: Left  Site: Femoral Vein  Ultrasound used? Yes    Type of catheter placed: 5 Lumen Thermogard XP   (TGXP)  Insertion depth (cm): 45 cm  Post-venous cannulation confirmed by: Appropriate venous blood return, ultrasound visualization of wire & CXR.  Number of attempts: 1     The patient tolerated the procedure well. Immediate complications:NONE     Norris Blue MD available for assistance.     Nicky Ellington MD,   Cardiovascular Disease Fellow  Pager [phone]

## 2021-12-30 NOTE — PLAN OF CARE
Neuro:  Pt sedated on Propofol, Fentanyl, and Versed. Pupils equal and reactive. Pt unresponsive to noxious stimuli. Since 0600, pt's bilateral feet have rapid, myoclonic movement every 15-20 minutes. Cooling started at 0453; target temp of 34 reached at 0545.    Cardiac: Initially SB with Hr 30s and Epinephrine titrated to keep MAPs>65. At ~0545, SR with Hr 70s and Dopamine titrated to keep MAPs>65.     Respiratory: On CMV settings (RR 22, Peep 8, ) at 80%. Lung sounds diminished.     GI/: OG to LIS. Urine output 0-10ml/hr - MD aware, continue to monitor.    Lines: PIVx2. R radial arterial line. L femoral thermogard line.     Intervention: Upon arrival, pt having generalized rapid, myoclonic movements - Ativan 4mg given x2, Versed gtt added, and Propofol gtt increased; myoclonic movements decreased. At ~0530, HR decreasing to 27 - MD notified and Atropine 0.5mg given x2; HR responded by elevating to 70s and decreasing to 15 for ~10 minutes (pt maintained a pulse) - Epinephrine  gtt stopped and Dopamine gtt started; HR remained 70s with Dopamine gtt.    No family called RN for update overnight.      Plan: Continue to monitor and update MD as needed. Continue plan of care.       Problem: Adult Inpatient Plan of Care  Goal: Plan of Care Review  Outcome: No Change

## 2021-12-30 NOTE — ED NOTES
Set room up to receive patient. Assisted moving patient from ambulance cot to ours. Hooked patient to heart monitor, bp cuff, 02 sensor, performed EKG. Assisted removing patients clothing. Assisted as needed . Applied warm blankets to patient. Put patient belongings in bag.

## 2021-12-30 NOTE — PROGRESS NOTES
[facility]   Critical Care Cardiology - Progress Note    Barbara Nelson MRN: [id number]  Age: 76 year old, : 1945  Date: 2021    Assessment and Plan:  Barbara Nelson is a 76 year old female with PMHx of GOLD stage II COPD, hx of non-small cell lung cancer s/p right lobectomy in , stable lung nodule, hypertension, anxiety & peripheral neuropathy who presents as a transfer from the [facility] ED to the [facility] CICU on 2021 s/p cardiac arrest.     The patient was reportedly watching TV earlier tonight (2021 8:30-9PM) when she complained of sudden onset shortness of breath. She started a nebulizer treatment- however half way through collapsed (witnessed by ). He administered breaths (no compressions). Therefore, there was no bystander CPR. first responders arrived on the scene approximately 15 minutes s/p arrest and found the patient to be pulseless and agonal breathing. Initial rhythm was asystole vs PEA (non-shockable). CPR begun immediately, 3 rounds of epinephrine given; ROSC achieved after approximately 10-12 mins of ACLS. Patient initiated on an epinephrine drip, given 50 mEq bicarb and intubated on the field. On arrival to the [facility] ED, she was noted to be bradycardic (HR 40's ?undetermined rhythm 2/2 poor baseline, w/ new RBBB with deep TWI in septal leads), no ST elevation/significant depressions seen. The patinet was then transferred to [facility] for further care.    Upon arrival to [facility] patient was found to be bradycardic; EP was consulted whom felt the patinet was likely in complete heart block.  Dopamine and epinephrine drips were initiated with subsequent improvement to HR.  In addition, atropine was trialed and seemed to improve HR.  In addition, there was witnessed seizure like activity (RUL, right chikis-oral); Neurocritical care assessed the patinet and elected to start both propofol and versed drips. A thermogard cooling  catheter was placed and cooling was initiated to 34C.     Today's Plan:  - vEEG initiation  - stop propofol  - temp pacing wire placement  - wean vent as able  - stop vancomycin    Neurology  # Concern for hypoxic brain injury  # Seizure-like activity  Intubated and sedated. Cooled to 34 degrees. Did have apparent seizure activity on arrival (RUL & right chikis-oral region), improved with benzodiazepines.   - Propofol ggt off today  - versed ggt; Wean per NCC  - fentanyl ggt; wean as able  - NCC consult; appreciate recommendations  - vEEG today  - head CT once rewarmed (?tomorrow)  - Protective hypothermia- patient cooled to 34 degrees.    Cardiovascular  # PEA vs asystolic cardiac arrest   # Lactic acidosis, improving  Unclear etiology of arrest. At this point, the highest suspicion is for ACS vs PE vs hypoxia secondary to indolent respiratory infection.   - cooling as above  - formal echocardiogram today   - doppler venous bilateral today    # 3rd degree AV block  Started cooling on arrival. HR 30s. Required atropine x 3 with temporary improvement in heart rate. Ultimately dopamine started to support rate.   - EP consult, appreciate recommendations  - temporary transvenous pacing wire today  - continue dopamine ggt at 5 - stop after cath lab  - pacer pads on patient    Pulmonary  # Acute hypoxic respiratory failure  # Pulmonary edema with R pleural effusion  # Possible aspiration pneumonia  Vent settings: CMV 22/380/8/80. ABG 7.35/46/170/25  - Broad-spectrum antibiotics and sputum cultures (see ID plan).  - hold diuresis today - will initiate if worsening hypoxia  - Wean vent as able.  - Daily CXR.  - Q6h ABGs for now.    Gastrointestinal, Nutrition  # Abdominal Distension  # NPO for hypothermia  - CT Chest, abdomen, pelvis possible tomorrow  - NPO for hypothermia.  - Monitor q12 LFTs.  - Bowel regimen - on hold for now due to hypothermia.  - GI prophylaxis: Protonix 40 mg IV daily.    Renal, Electrolytes  #  Possible undiagnosed CKD  # Lactic acidosis  # Anion gap metabolic acidosis, resolved  Creatinine 1.39 mg/dL on arrival. Last creatinine ~0.8 in our system in 2014. Lactic acid initially 8.8, improved to 3 on admission.  - Monitor urine output hourly. Flores for strict I/O's  - Maintain K>3 and Mg>2 while cooled.     Infectious Disease  # Aspiration pneumonia  WBC 26.4 on arrival. Procal 21. Leukocytosis possibly 2/2 arrest. Blood cultures collected.   - cefepime x5 days for aspiration pneumonia.   - stop vancomycin as MRSA is negative  - Blood cultures pending  - Follow urine, sputum cultures as well  - Monitor for signs of infection given cooling, lines, and leukocytosis.    Hematology  # Abnormal INR, mild  HGB 13  INR 1.21  T.Bili 0.4   - Transfuse for HGB <8  - DVT prophylaxis: heparin SQ    Endocrinology  # Hyperglycemia  No known medical history. BG elevated in setting of critical illness. HbA1c: 5.6% 3 months ago  - q2 glucose checks with insulin gtt if needed    MSK/Skin  No active issues.    Pertinent Lines  L femoral Thermogard December 30, 2021  R radial arterial line December 30, 2021  ETT December 29, 2021  Flores catheter December 30, 2021  OG tube December 30, 2021    ICU Cares:  Daily CXR: ETT 5.7 from al.  Bilateral opacities likely representing edema  Fluids/Feeds: TF on hold given cooled, fluids not indicated  DVT Prophylaxis: subcutaneous heparin for now, Q8 hours  GI Prophylaxis: protonix  Bowel Regimen: hold while cooled  Anticipated Floor Transfer: N/A    Family Update: , update by me    Patient seen and discussed with Dr. Norris Torres.  Assessment and plan as above.    Nava Jenkins PA-C  Critical Care Cardiology  Pager: [phone]      Interval History:  Admission overnight.  Reportedly seizure like activity on arrival.  Gag reflex intact, although no other means of neurologic assessment at present.      Objective Findings:  Temp:  [92.5  F (33.6  C)-98.3  F (36.8  C)]  93.2  F (34  C)  Pulse:  [26-98] 73  Resp:  [10-28] 22  BP: ()/() 102/51  MAP:  [51 mmHg-102 mmHg] 81 mmHg  Arterial Line BP: ()/(39-87) 134/54  FiO2 (%):  [50 %-100 %] 80 %  SpO2:  [82 %-100 %] 100 %    I/O:  Intake/Output Summary (Last 24 hours) at 12/30/2021 0844  Last data filed at 12/30/2021 0800  Gross per 24 hour   Intake 598.79 ml   Output 521 ml   Net 77.79 ml       Physical Exam:  GEN: intubated, sedated  HEENT: pupils 1mm. No other cranial trauma  Pulm: seemingly CTAB  Cardiac: irregular rate/rhythm. No murmur, rub, gallop  GI: soft, non distended  Neuro: pupils pinpoint as above.  No other motion at present  Integument: no appreciable skin breakdown  Vascular: LE warm, well perfused      Medications:    ceFEPIme (MAXIPIME) IV  2 g Intravenous Q24H     heparin ANTICOAGULANT  5,000 Units Subcutaneous Q8H     ipratropium - albuterol 0.5 mg/2.5 mg/3 mL  3 mL Nebulization Q4H     pantoprazole  40 mg Per Feeding Tube QAM AC    Or     pantoprazole (PROTONIX) IV  40 mg Intravenous QAM AC     vancomycin  1,500 mg Intravenous Q24H       DOPamine 5 mcg/kg/min (12/30/21 0700)     EPINEPHrine Stopped (12/30/21 0540)     fentaNYL 200 mcg/hr (12/30/21 0700)     propofol (DIPRIVAN) infusion 50 mcg/kg/min (12/30/21 0700)    And     - MEDICATION INSTRUCTIONS -       midazolam 1 mg/hr (12/30/21 0700)         Labs:   CMP  Recent Labs   Lab 12/30/21  0543 12/30/21  0426 12/30/21  0424 12/29/21 2219   NA  --   --  144 146*   POTASSIUM  --   --  3.9 4.0   CHLORIDE  --   --  112* 109   CO2  --   --  23 21   ANIONGAP  --   --  9 16*   * 144* 167* 297*   BUN  --   --  24 18   CR  --   --  1.39* 1.21*   GFRESTIMATED  --   --  39* 46*   MIKE  --   --  8.7 8.4*   MAG  --   --  1.9 2.0   PROTTOTAL  --   --  6.5* 6.2*   ALBUMIN  --   --  2.8* 2.4*   BILITOTAL  --   --  0.7 0.4   ALKPHOS  --   --  89 92   AST  --   --  46* 41   ALT  --   --  35 29     CBC  Recent Labs   Lab 12/30/21  3304 12/29/21  7692   WBC  26.4* 19.7*   RBC 4.43 4.39   HGB 13.3 13.3   HCT 41.9 42.8   MCV 95 98   MCH 30.0 30.3   MCHC 31.7 31.1*   RDW 13.3 13.0    241     Arterial Blood Gas  Recent Labs   Lab 12/30/21  0546 12/30/21  0426 12/30/21  0003 12/29/21  2258   PH 7.35 7.36  --   --    PCO2 46* 45  --   --    PO2 170* 83  --   --    HCO3 25 25  --   --    O2PER 100 80 100 100         Pertinent Imaging Studies:  Coronary angiogram: N/A    Echo: pending      Nava Jenkins PA-C  Critical Care Cardiology  Pager: [phone]

## 2021-12-30 NOTE — PROGRESS NOTES
"CLINICAL NUTRITION SERVICES - ASSESSMENT NOTE     Nutrition Prescription    RECOMMENDATIONS FOR MDs/PROVIDERS TO ORDER:  Once rewarmed, and if aggressive POC is desired, recommend feeding via OGT:    Osmolite 1.5 Devon @ 15 mL/hr and adv by 10 mL q4h until @ goal rate of 65 ml/hr to provide: 2340 kcals (30 kcal/kg), 97 g PRO (1.2 g/kg), 1188 mL H2O, 317 g CHO, and 0 g fiber daily. This will meet estimated kcal and protein goals.    Malnutrition Status:    Pt does not meet two of the established criteria necessary for diagnosing malnutrition but is at risk for malnutrition.    Recommendations already ordered by Registered Dietitian (RD):  Discussed pt's cooled status with bedside RN. Will be cooled x 24 hrs.       REASON FOR ASSESSMENT  Barbara Nelson is a 76 year old female assessed by the dietitian for Provider Order - \"post-cardiac arrest will need TF once warmed.\"    NUTRITION HISTORY  Unable to interview pt d/t intubation and sedation. Pt is being cooled.     CURRENT NUTRITION ORDERS  Diet: NPO  Intake/Tolerance: GHADA, suspect no nutrition issues PTA    LABS  Labs reviewed- negative urinary ketones 12/30    MEDICATIONS  Medications reviewed    ANTHROPOMETRICS  Height: 176.5 cm (5' 9.5\")  Most Recent Weight: 104.4 kg (230 lb 2.6 oz)    IBW: 67 kg  BMI: Obesity Grade I BMI 30-34.9  Weight History:   Wt Readings from Last 10 Encounters:   12/30/21 104.4 kg (230 lb 2.6 oz)   12/30/21 101.1 kg (222 lb 14.2 oz)   04/15/21 94.9 kg (209 lb 3.5 oz)   05/13/14 83.5 kg (184 lb)   02/17/06 81.2 kg (179 lb)   12/02/05 81.5 kg (179 lb 9.6 oz)   11/23/05 79.7 kg (175 lb 9.6 oz)   11/14/05 82.1 kg (181 lb)     Dosing Weight: 76 kg (adj wt)    ASSESSED NUTRITION NEEDS  Estimated Energy Needs: 7186-0192 kcals/day (25 - 30 kcals/kg)  Justification: Maintenance  Estimated Protein Needs:  g/day (1.2 - 1.5 g/kg)  Justification: Hypercatabolism with acute illness  Estimated Fluid Needs: Per provider pending fluid " status    PHYSICAL FINDINGS  See malnutrition section below.  Dry skin, toenails    MALNUTRITION  % Intake: Unable to assess but do not assume pt had any issues with appetite or PO intake PTA per chart review  % Weight Loss: None noted  Subcutaneous Fat Loss: None observed  Muscle Loss: sarcopenia noted in upper body, but no significant nutrition-related muscle wasting found  Fluid Accumulation/Edema: None noted  Malnutrition Diagnosis: Pt does not meet two of the established criteria necessary for diagnosing malnutrition but is at risk for malnutrition    NUTRITION DIAGNOSIS  Inadequate protein-energy intake related to resp status and cooled condition inhibiting ability to take PO or start EN, respectively, as evidenced by pt NPO x 1 day, cooling x 24 hrs (started early this AM).      INTERVENTIONS  Implementation  Nutrition Education: Not appropriate at this time due to patient condition   Collaboration with other providers - bedside RN    Goals  Total avg nutritional intake to meet a minimum of 25 kcal/kg and 1.2 g PRO/kg daily (per dosing wt 78 kg).     Monitoring/Evaluation  Progress toward goals will be monitored and evaluated per protocol.    Anushka Santoyo RD, LD

## 2021-12-30 NOTE — CONSULTS
[facility]  Neurocritical Care Consultation    Patient Name:  Barbara Nelson  MRN:  [id number]    :  1945  Date of Service:  2021  Primary care provider:  Ze Galvan            HPI:  Barbara Nelson is a 76 year old female with PMHx of GOLD stage II COPD, hx of non-small cell lung cancer s/p right lobectomy in , stable lung nodule, hypertension, anxiety & peripheral neuropathy who presents as a transfer from the [facility] ED to the [facility] CICU on 2021 s/p cardiac arrest.  Per 's report, patient was watching TV around 8 PM when she started having sudden onset of shortness of breath and shortly after became unresponsive.   called 911.  On EMS arrival about 15 minutes later, patient was pulseless.  Initial rhythm was PEA versus asystole, after 3 rounds of epinephrine, they achieved ROSC for about 10 minutes.  Patient was started on epinephrine drip and was given 50 mEq of bicarb and was transferred to [facility] ED.    On arrival to [facility] ED, the pt was bradycardic, with new RBBB and deep t-wave inversions in septal leads. Pt also found to be markedly acidotic (pH 7.14) and elevated lactate (8.8). Subsequently was transferred to [facility] for further cares. On arrival, pt remained bradycardic and was seen by Electrophysiology who are concern that pt was in complete heart block. Pressors were started w/ improvement in HR and cooling was initiated. There was some report of seizure-like activity in her LUE and facial twitching, which resolved with heavy sedation and Ativan injection.    Today, the pt is still in the process of being re-warmed and remains sedated on Midazolam 8mg/hr and Fentanyl 100mcg/hr. Propofol was recently discontinued due to hemodynamic concerns. Around noon, NCC was called to bedside with concern for seizure activity which was made up of intermittent full body shaking lasting 1s and occurring around once per  minute. Movements had EEG correlates of polyspikes. Gave 4mg Ativan x2 which appeared to resolve these movements and pt was subsequently taken to cath lab for placement of a temporary pacing wire    ROS    Unable to perform due to altered mental status.    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     Thyroid Nodule     Pure hypercholesterolemia      Past Surgical History:   Procedure Laterality Date     ARTHROSCOPY SHOULDER RT/LT  1993    left     COLONOSCOPY  1/2005     ENDOSCOPIC RETROGRADE CHOLANGIOPANCREATOGRAM N/A 4/15/2021    Procedure: ENDOSCOPIC RETROGRADE CHOLANGIOPANCREATOGRAPHY with biliary sphincterotomy and stone removal;  Surgeon: Marshall Paz MD;  Location: [facility] OR     SURGICAL HISTORY OF -   age 8    Tonsils     SURGICAL HISTORY OF -   1980's     Breast Bx      SURGICAL HISTORY OF -   1995    Hysterectomy, Bilateral Oophorectomy       Medications     Current Facility-Administered Medications:      acetaminophen (TYLENOL) tablet 650 mg, 650 mg, Oral, Q4H PRN **OR** acetaminophen (TYLENOL) solution 650 mg, 650 mg, Per NG tube, Q4H PRN, Harriet Cross MD     ceFEPIme (MAXIPIME) 2 g vial to attach to  ml bag for ADULTS or 50 ml bag for PEDS, 2 g, Intravenous, Q24H, Norris Torres MD, 2 g at 12/30/21 0511     glucose gel 15-30 g, 15-30 g, Oral, Q15 Min PRN **OR** dextrose 50 % injection 25-50 mL, 25-50 mL, Intravenous, Q15 Min PRN **OR** glucagon injection 1 mg, 1 mg, Subcutaneous, Q15 Min PRN, Harriet Cross MD     DOPamine 400 mg in dextrose 5% 250 mL (adult std) - premix - CENTRAL, 2-20 mcg/kg/min (Dosing Weight), Intravenous, Continuous, Nicky Ellington MD, Stopped at 12/30/21 1349     EPINEPHrine (ADRENALIN) 5 mg in sodium chloride 0.9 % 250 mL infusion CENTRAL, 0.01-0.3 mcg/kg/min (Dosing Weight),  Intravenous, Continuous, Norris Torres MD, Stopped at 12/30/21 0540     fentaNYL (SUBLIMAZE) 50 mcg/mL bolus from infusion pump 25 mcg, 25 mcg, Intravenous, Q1H PRN, Harriet Cross MD     fentaNYL (SUBLIMAZE) infusion,  mcg/hr, Intravenous, Continuous, Harriet Cross MD, Last Rate: 2 mL/hr at 12/30/21 1404, 100 mcg/hr at 12/30/21 1404     [Held by provider] heparin ANTICOAGULANT injection 5,000 Units, 5,000 Units, Subcutaneous, Q8H, Harriet Cross MD     ipratropium - albuterol 0.5 mg/2.5 mg/3 mL (DUONEB) neb solution 3 mL, 3 mL, Nebulization, Q4H, Harriet Cross MD, 3 mL at 12/30/21 0803     propofol (DIPRIVAN) infusion, 5-75 mcg/kg/min (Dosing Weight), Intravenous, Continuous, Stopped at 12/30/21 1100 **AND** propofol (DIPRIVAN) bolus from infusion pump 10-20 mg, 10-20 mg, Intravenous, Q30 Min PRN **AND** CK total, , , CONDITIONAL (SPECIFY) **AND** Triglycerides, , , CONDITIONAL (SPECIFY) **AND** Medication Instruction, , Does not apply, Continuous PRN **AND** Notify Physician, , , Effective Now, Harriet Cross MD     midazolam (VERSED) drip - ADULT 100 mg/100 mL in NS (pre-mix), 1-8 mg/hr, Intravenous, Continuous, Harriet Cross MD, Last Rate: 8 mL/hr at 12/30/21 1400, 8 mg/hr at 12/30/21 1400     naloxone (NARCAN) injection 0.2 mg, 0.2 mg, Intravenous, Q2 Min PRN **OR** naloxone (NARCAN) injection 0.4 mg, 0.4 mg, Intravenous, Q2 Min PRN **OR** naloxone (NARCAN) injection 0.2 mg, 0.2 mg, Intramuscular, Q2 Min PRN **OR** naloxone (NARCAN) injection 0.4 mg, 0.4 mg, Intramuscular, Q2 Min PRN, Norris Torres MD     pantoprazole (PROTONIX) 2 mg/mL suspension 40 mg, 40 mg, Per Feeding Tube, QAM AC **OR** pantoprazole (PROTONIX) IV push injection 40 mg, 40 mg, Intravenous, QAM AC, Harriet Cross MD, 40 mg at 12/30/21 0929     polyethylene glycol (MIRALAX) Packet 17 g, 17 g, Oral, Daily PRN, Harriet Cross MD     senna-docusate  "(SENOKOT-S/PERICOLACE) 8.6-50 MG per tablet 1 tablet, 1 tablet, Oral, BID PRN **OR** senna-docusate (SENOKOT-S/PERICOLACE) 8.6-50 MG per tablet 2 tablet, 2 tablet, Oral, BID PRN, Harriet Cross MD     vancomycin 1500 mg in 0.9% NaCl 250 ml intermittent infusion 1,500 mg, 1,500 mg, Intravenous, Q24H, Norris Torres MD, 1,500 mg at 12/30/21 0553    Allergies  Allergies   Allergen Reactions     Lisinopril Other (See Comments)     Comment: cough, Description:      Losartan Other (See Comments)     Comment: dizziness, Description:      Latex Other (See Comments) and Rash     Comment: Itching, Description:          Social History  Unable to perform due to altered mental status  Per chart review,   Social History     Tobacco Use     Smoking status: Former Smoker     Packs/day: 0.50     Types: Cigarettes     Quit date: 2/4/2006     Years since quitting: 15.9     Smokeless tobacco: Never Used   Substance Use Topics     Alcohol use: Yes     Comment: occasionally       Family History    Unable to perform due to altered mental status  Per chart review,   Family History   Problem Relation Age of Onset     Depression Mother      Cancer Maternal Grandmother      Cancer Maternal Grandfather      Cancer Brother               Physical Exam     Vitals: /51 (BP Location: Right arm)   Pulse 73   Temp (!) 93.4  F (34.1  C)   Resp 20   Ht 1.765 m (5' 9.5\")   Wt 104.4 kg (230 lb 2.6 oz)   SpO2 96%   BMI 33.50 kg/m    General: Sedated on Propofol, no acute distress  HEENT: Normocephalic, atraumatic. Sclera anicteric.  Resp: Intubated, on mechanical ventilation  CVS: Normal S1/S2  Abdomen: Soft  Neuro: (performed while the patient was on sedating medications: Propofol and Fentanyl)  Mental status: Does not open eyes to verbal or noxious stimuli. Does not follow commands.   Cranial nerves: Eyes conjugate. Pupils 2mm, equal, round, eactive to light. Absent corneal, present cough reflex.  Motor: Normal tone.  Twitching " movements of right upper extremity and right side of the face was noted. Does not spontaneously move extremities.  Sensory: Not responsive to noxious stimuli in all four extremities.   Coordination: Unable to assess due to mental status.  Gait: Unable to assess due to mental status.    Investigations   CTH (12/30/2021)  Impression:   1. Questionable small hypodense foci scattered throughout the  bilateral basal ganglia, thalami and right cerebellum are new from  1/30/2018 and may represent acute infarctions. Mildly indistinct  cerebral gray-white matter differentiation diffusely is concerning for  hypoxic ischemic insult. Consider MRI to further evaluate.  2. No acute intracranial hemorrhage         Impression     Barbara Nelson is a 76 year old female who presented on 12/30/2021 with cardiac arrest. The NCC service was consulted to evaluate for prognostication. Her neurologic examination is limited as the patient remains cooled at this time and she is receiving sedation. Head Imaging shows no sign of hemorrhage or large area of ischemic damage. Grey-white differentiation appears grossly intact on CTH, though final read is pending. As to her myoclonic movements, while these did calm slightly with Ativan, was reported that they quickly began again while pt was in the cath lab. Looking again over the pt's EEG, I cam concerned that it appears rather isoelectric, broken only by the polyspikes during her myoclonic episodes. 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 As such, no further Ativan boluses are needed to control these movements and no AED's need be started. Will await final read by epileptologist. Overall, as pt remains on sedation and is still , it is too early to prognosticate on her eventual neurologic status at this time, though concern for hypoxic injury remains high         Recommendations     - vEEG  - No need to start  anti-seizure medication at this time  - Avoid hypotonic solutions as they worsen cerebral edema  - Avoid nitroprusside/nitroglycerin as able - can increase ICP's   - When safe to do so, limitation of CNS acting medications will permit a more accurate neurological assessment  - Wean sedation as able  - Will follow neurologic examination once rewarmed and weaned of sedation  - The neurocritical care service will continue to follow      Patient was discussed with the NCC attending, Dr. Ramirez.    Charlette Doyle MD  Neurology Resident PGY2  [facility] [phone]

## 2021-12-30 NOTE — PROCEDURES
PROCEDURE:   Ultrasound guided R Radial artery line placement.     INDICATION: Blood pressure monitoring, post-cardiac arrest    PROCEDURE :   Harriet Cross    SUPERVISOR:  Nicky Ellington    CONSENT: Obtained and in the paper chart    UNIVERSAL PROTOCOL: Patient Identification was verified, time out was performed, site marking was done. Imaging data reviewed. Full Barrier precaution done: Hands washed, mask, gloves, gown, cap all used.     PROCEDURE SUMMARY:    The patient was prepped and draped in the usual sterile manner using chlorhexidine scrub. 1% lidocaine was used to numb the region. The R radial artery was palpated and visualized on ultrasound.  The artery was successfully cannulated on the third pass. Pulsatile, arterial blood was visualized and the artery was then threaded with a guide wire using the Seldinger technique.  The needle was removed and ultrasound visualized the wire in the artery, a catheter was threaded over the wire, the wire was removed, and the catheter was sutured into place. A good wave-form was obtained. The patient tolerated the procedure well without any immediate complications. The area was cleaned and a dressing was applied.     Dr Ellington was present for the key portions of the procedure.    ESTIMATED BLOOD LOSS: 5mL    Harriet Cross MD, MPH   Internal Medicine, PGY-3

## 2021-12-30 NOTE — ED TRIAGE NOTES
Called 911 for SOB, HX of COPD per medics. 15 minute response time. On ambulance arrival found t have gasping breathing and no pulse. CPR begun then. 3 IV push epi and 0.1 mcg/kg/min epi drip. 50 mEq bicarb. 24 mc lip 7.0 ET tube. 25 mcg Fentanyl given x 2.

## 2021-12-30 NOTE — H&P
Critical Care Cardiology: History and Physical  Barbara Nelson MRN: [id number]  Age: 76 year old, : 1945  Date: 2021    History of Present Illness     Barbara Nelson is a 76 year old female with PMHx of GOLD stage II COPD, hx of non-small cell lung cancer s/p right lobectomy in , stable lung nodule, hypertension, anxiety & peripheral neuropathy who presents as a transfer from the [facility] ED to the [facility] CICU on 2021 s/p cardiac arrest.    The patient was reportedly watching TV earlier tonight (2021 8:30-9PM) when she complained of sudden onset shortness of breath. She started a nebulization treatment- however half way through collapsed (witnessed by ). He administered breaths (no compressions). With-in a 15-minute response time, first responders arrived on the scene and found the patient to be pulseless and agonal breathing. No bystander CPR. Initial rhythm asystole vs PEA (non-shockable). CPR begun immediately, 3 rounds of epinephrine given; ROSC achieved after approximately 10-12 mins of ACLS. Patient initiated on an epinephrine drip, given 50 mEq bicarb and intubated on the field.     On arrival to the [facility] ED, she was noted to be bradycardic (HR 40's ?undetermined rhythm 2/2 poor baseline, w/ new RBBB with deep TWI in septal leads), no ST elevation/significant depressions seen. Stat labs at OSH as follows:    VB.14/66/53/22. Lactate 8.8  CBC: WBC 19.7/ HGB 13.3/   LFTs normal. Creatinine 1.21 (baseline 1-1.1), normal lytes.    UDS negative. COVID negative. Imaging at [facility] ED: CXR- bilateral diffuse pulmonary opacities visualized concerning for pulm edema vs infection.      The patient was thereafter transferred to [facility] for further Rx.     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     Thyroid Nodule     Pure hypercholesterolemia        Family History   Problem Relation Age of Onset     Depression Mother      Cancer Maternal Grandmother      Cancer Maternal Grandfather      Cancer Brother      Social History     Socioeconomic History     Marital status:      Spouse name: Not on file     Number of children: Not on file     Years of education: Not on file     Highest education level: Not on file   Occupational History     Not on file   Tobacco Use     Smoking status: Former Smoker     Packs/day: 0.50     Types: Cigarettes     Quit date: 2/4/2006     Years since quitting: 15.9     Smokeless tobacco: Never Used   Substance and Sexual Activity     Alcohol use: Yes     Comment: occasionally     Drug use: No     Sexual activity: Not on file   Other Topics Concern     Not on file   Social History Narrative     Not on file     Social Determinants of Health     Financial Resource Strain: Not on file   Food Insecurity: Not on file   Transportation Needs: Not on file   Physical Activity: Not on file   Stress: Not on file   Social Connections: Not on file   Intimate Partner Violence: Not on file   Housing Stability: Not on file       Review of Systems     ROS as mentioned in HPI. Detailed HPI could not be obtained as patient intubated and sedated.        Physical Exam     @Vitals: /51 (BP Location: Right arm)   Pulse (!) 34   Temp (!) 96.6  F (35.9  C)   Resp 22   SpO2 98%     BMI= There is no height or weight on file to calculate BMI.     GENERAL APPEARANCE: Intubated, sedated. NAD.  HEENT: No icterus,  ETT in place, OG tube in place.  CARDIOVASCULAR:Bradycardic; Regular rate and rhythm, normal S1/S2, no S3/S4 and no murmur, click or rub. Normal PMI.   RESP: Coarse bilaterally. Mechanical ventilation.    GASTRO: Soft, bowel sounds hypoactive but present.  GENITOURINARY: Flores in place.  EXTREMITIES: Cool, no edema. ThermoGard in left  groin.   NEURO: Sedated and intubated. Seizure like noted with twitching of right upper limb and right perioral region.  Pupils 2 mm, equal and reactive.  Not overbreathing vent.  INTEGUMENTARY: No rashes. Cannula/Line sites CDI.  LINES/TUBES/DRAINS: (noted below) ThermoGard in L groin. R radial Arterial line. ETT. OG. Flores Catheter.    Vent Settings:  Resp: 22 SpO2: 98 % O2 Device: Mechanical Ventilator      Arterial Blood Gas:   Recent Labs   Lab 12/30/21  0546 12/30/21 0426 12/30/21  0003 12/29/21 2258   PH 7.35 7.36  --   --    PCO2 46* 45  --   --    PO2 170* 83  --   --    HCO3 25 25  --   --    O2PER 100 80 100 100     There were no vitals filed for this visit.No intake/output data recorded.  Recent Labs   Lab 12/30/21  0543 12/30/21 0426 12/30/21 0424 12/29/21 2219   NA  --   --  144 146*   POTASSIUM  --   --  3.9 4.0   CHLORIDE  --   --  112* 109   CO2  --   --  23 21   ANIONGAP  --   --  9 16*   * 144* 167* 297*   BUN  --   --  24 18   CR  --   --  1.39* 1.21*   MIKE  --   --  8.7 8.4*     No components found for: URINE   Recent Labs   Lab 12/30/21 0424 12/29/21 2219   AST 46* 41   ALT 35 29   BILITOTAL 0.7 0.4   ALBUMIN 2.8* 2.4*   PROTTOTAL 6.5* 6.2*   ALKPHOS 89 92     Temp: (!) 96.6  F (35.9  C) Temp src: BladderTemp  Min: 94.8  F (34.9  C)  Max: 98.3  F (36.8  C)   Recent Labs   Lab 12/30/21 0424 12/29/21 2219   WBC 26.4* 19.7*   HGB 13.3 13.3   HCT 41.9 42.8   MCV 95 98   RDW 13.3 13.0    241     Recent Labs   Lab 12/30/21 0424 12/29/21 2219   INR 1.21* 1.21*   PTT 29  --      Recent Labs   Lab 12/30/21  0543 12/30/21  0426 12/30/21  0424 12/29/21  2219   * 144* 167* 297*       Assessment and Plan     Barbara Nelson is a 76 year old female with a history of COPD, NSCLC s/p R lobectomy 2006, HTN, neuropathy admitted 12/30 to the CICU on 12/30/2021 status post PEA vs asystolic cardiac arrest.    Neurology:   # Concern for hypoxic brain injury  # Seizure-like  acticity  Intubated and sedated. Cooled to 34 degrees. Did have apparent seizure activity on arrival (RUL & right chikis-oral region), improved with benzodiazepines.     Imaging/procedures:   CTH: ordered, pending.  EEG: ordered, pending.    Sedation/paralytics:   Propofol, Versed and fentanyl gtt.     Plan:   - Propofol, Versed and fentanyl as needed to control seizures and for sedation.  Will de-escalate sedation once EEG initiated & status/subclinical seizures ruled out.  - Protective hypothermia- patient cooled to 34 degrees.  - EEG requested. Neurocritical Care consulted-appreciate recommendations.  - Neuro protective strategies in place- permissive hypercapnea.       Cardiovascular:   # PEA vs asystolic cardiac arrest   # Lactic acidosis  # 3rd degree AV block    Unclear etiology of arrest. At this point, the highest suspicion is for ACS vs PE vs hypoxia secondary to indolent respiratory infection. Started cooling on arrival. HR 30s. Required atropine x 3 with temporary improvement in heart rate. Ultimately dopamine started to support rate.     Imaging/procedures:   Coronary angiogram: none  TTE: ordered; POC US revealed moderate LV dysfunction with inferolateral wall hypokinesis, mild RV dysfunction (Lindo sign not appreciated).   EKG: 3rd degree AV block, new onset right bundle branch block with TWI in V1-V3.     Vasoactive/antiarrhythmic infusions:   - Dopamine and epinephrine gtt.     Plan:  - Electrophysiology consulted due to intermittently hemodynamically significant complete heart block necessitating dopamine gtt, epinephrine and atropine.  The patient to potentially undergo transvenous pacemaker placement today.   - As for etiology of arrest, formal TTE to follow, troponin to be trended and potential coronary angiogram this morning in addition to CT angiogram chest for PE.   - Hold temp at 34 degrees for TTM.     Pulmonary:  # Acute hypoxic respiratory failure  # Pulmonary edema with R pleural  effusion  # Possible aspiration pnuemonia    - AB.36/45/83/25  Ventilation Mode: CMV/AC  (Continuous Mandatory Ventilation/ Assist Control)  FiO2 (%): 80 %  Rate Set (breaths/minute): 22 breaths/min  Tidal Volume Set (mL): 380 mL  PEEP (cm H2O): 8 cmH2O  Oxygen Concentration (%): 80 %  Resp: 22    Imaging/procedures:   CXR:  bilateral diffuse pulmonary opacities visualized concerning for pulm edema vs infection.    CTA chest: pending.    Plan:  - Broad-spectrum antibiotics and sputum cultures (see ID plan).  - Diuresis when hemodynamically tolerated.   - Wean vent as able.  - Daily CXR.  - Q2h ABGs for now.     GI and Nutrition:   #NPO for hypothermia     Imaging/procedures:   CT abdomen pelvis pending. LFTs WNL.     Plan:   - NPO for hypothermia.  - Monitor q12 LFTs.  - NPO while cooled - nutrition consult pending feeding tube placement once patient warmed.  - Bowel regimen - on hold for now due to hypothermia.  - GI prophylaxis: Protonix 40 mg IV daily.    Renal, Fluid, and Electrolytes:   # Possible undiagnosed CKD (baseline creatinine 1-1.1 mg/dL)  # Anion gap metabolic acidosis, resolved  # Lactic acidosis     Creatinine 1.39 mg/dL on arrival (baseline ~1.1 mg/dL). Lactic acid initially 8.8, improved to 3 on admission.    Plan:   - Monitor urine output hourly. Flores for strict I/O's  - Maintain K>3 and Mg>2 while cooled.      Infectious Disease:   # Aspiration pneumonia  WBC 26 on arrival. Procal 21. Leukocytosis possibly 2/2 arrest. Blood cultures collected.     Plan:   - Vancomycin/cefepime x5 days for aspiration pneumonia.   - Blood cultures pending  - Follow urine, sputum cultures as well  - Monitor for signs of infection given cooling, lines, and leukocytosis.     Hematology and Oncology:   # Deranged INR, mild  HGB 13  INR 1.21  T.Bili 0.4      Plan:   - Transfuse for HGB <8  - DVT prophylaxis: heparin SQ    Endocrine:   # Type 2 Diabetes Mellitis  # Hyperglycemia    No known medical history.  BG elevated in setting of critical illness.  HbA1c: 5.6 3 months ago    Plan:   - q2 glucose checks with insulin gtt if needed    Lines:   L femoral Thermoguard December 30, 2021  R radial arterial line December 30, 2021  ETT December 29, 2021  Flores catheter December 30, 2021  OG tube December 30, 2021  Restraint: needed    Current lines are required for patient management      Family update by me today: Yes   Code Status: Full    The pt was discussed and evaluated with Norris Marmolejo MD, attending physician, who agrees with the assessment and plan above.     Nicky Ellington MD,   Cardiovascular Disease Fellow  Pager [phone]

## 2021-12-30 NOTE — ED NOTES
Pt arrived to ED intubated 7.5 ETT 24 cm @ lip.  Initial settings VC16 350 100% +5.  BBS diminished.  Sats 95%

## 2021-12-30 NOTE — CONSULTS
Cardiac Electrophysiology consultation      Reason For Consultation: Bradycardia     HPI:    75 y/o lady admitted following PEA arrest. Intubated in the field. Heavily sedated and cooled following seizure activity.    Hx significant for:  COPD  NSCLC s/p R lobectomy '06  HTN  Anxiety  Peripheral neuropathy    Pt intubated and sedated. Hx obtained per chart review and discussion with primary team. 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     Thyroid Nodule    Pure hypercholesterolemia        Past Surgical  History:      Past Surgical History:   Procedure Laterality Date    ARTHROSCOPY SHOULDER RT/LT  1993    left    COLONOSCOPY  1/2005    ENDOSCOPIC RETROGRADE CHOLANGIOPANCREATOGRAM N/A 4/15/2021    Procedure: ENDOSCOPIC RETROGRADE CHOLANGIOPANCREATOGRAPHY with biliary sphincterotomy and stone removal;  Surgeon: Marshall Paz MD;  Location: [facility] OR    SURGICAL HISTORY OF -   age 8    Tonsils    SURGICAL HISTORY OF -   1980's     Breast Bx     SURGICAL HISTORY OF -   1995    Hysterectomy, Bilateral Oophorectomy       Family History:      Family History   Problem Relation Age of Onset    Depression Mother     Cancer Maternal Grandmother     Cancer Maternal Grandfather     Cancer Brother        Social History:      Social History     Socioeconomic History    Marital status:      Spouse name: Not on file    Number of children: Not on file    Years of education: Not on file    Highest education level: Not on file   Occupational History    Not on file   Tobacco Use    Smoking status: Former Smoker     Packs/day: 0.50     Types: Cigarettes     Quit date: 2/4/2006     Years since quitting: 15.9    Smokeless tobacco: Never Used   Substance and Sexual Activity    Alcohol use: Yes     Comment: occasionally     Drug use: No    Sexual activity: Not on file   Other Topics Concern    Not on file   Social History Narrative    Not on file     Social Determinants of Health     Financial Resource Strain: Not on file   Food Insecurity: Not on file   Transportation Needs: Not on file   Physical Activity: Not on file   Stress: Not on file   Social Connections: Not on file   Intimate Partner Violence: Not on file   Housing Stability: Not on file       ROS:    A complete review of systems is negative except as noted above in the HPI.    Physical Exam:   Vitals: /51   Pulse (!) 34   Temp (!) 96.6  F (35.9  C)   Resp 22   SpO2 98%   Gen: Intubated and sedated  Neck: No JVD  Chest: Coarse throughout  Cardiovascular: RRR, no M/R/G  Abd: soft, ND, +BS  Neuro: sedated. +gag  Extremities: no LE edema         Relevant labs, imaging, medications and procedures were reviewed.  On presentation -> current  LA 8.8 -> 2.8  VBG 7.14/64/39 -> ABG 7.35/46/170  HCO3 21 -> 23  K 4 -> 3.9  BUN/Cr 18/1.21 -> 24/1.39  Mg 2.0 -> 1.9  Trop 44 -> 531  Pro lilliam 21 (H)  Pro BNP 1,728  WBC 19.7 -> 26.4  Hb 13.3 -> 13.3  Plts 241 -> 275  INR 1.21 -> 1.21  COVID neg    Hx of incomplete LBBB   EKGs: CHB with junctional escape -> dopamine -> NSR w/ atypical LBBB.     TTE: reported depressed LVEF, LV > RV (no prior TTE in our system)    CXR: b/l diffuse infiltrates    Telemetry: CHB with junctional escape, rates 30s-40s. Dopamine started and 1:1 conduction resumed with rates in the 70s.    Home meds significant for:  Atenolol 50mg daily    Sedated on propofol and versed    Assessment and Recommendations:   77 y/o lady admitted following PEA arrest. Intubated in the field. Heavily sedated and cooled following seizure activity.    Hx significant for:  COPD  NSCLC s/p R lobectomy '06  HTN  Anxiety  Peripheral neuropathy    Around 8:30pm 12/29/21 the patient complained of sudden onset SOB while watching TV. Nebulizer was used briefly but it appears she arrested.  EMS was called and arrived in 15 minutes (no CPR was provided prior to their arrival). Initial rhythm reported to be PEA vs asystole. 12 minutes of CPR, 3 rounds of epi -> ROSC. Bicarb and epi started in field. Pt was also intubated in the field. Arrived at [facility] ED where she was noted to be in CHB and diffuse pulmonary infiltrates on CXR. Lab trends as above.    EP was contacted and reviewed the EKG which revealed NSR with CHB and junctional escape. BP stable. Hypothermia protocol had been initiated and she was 33C at the time (rewarmed to 34C currently). Heavily sedated given seizure activity. While I was en route to the hospital, adenosine was administered with a brief period of 1:1 AV conduction before going back into CHB with junctional escape. She was then started on dopamine 5mcg/kg/min with resumption of 1:1 AV conduction. It appears her cardiac arrest may have been secondary to her respiratory failure (CXR, pro lilliam, leukocytosis) but cannot be certain. Her seizure activity is concerning as she had no CPR for at least 15 minutes. Per chart review it looks like she had been on atenolol 50mg at home. CTPE being obtained today. No plans for CT head today per primary.    #CHB w/ junctional escape  -Temp wire to be placed in the EP lab (consent obtained from her spouse)  -Maintain pacer pads on chest  -Atropine at bedside  -Continue dopamine gtt  -Formal TTE  -Telemetry  -Hold any AVN blocking agents  -Remainder of management per primary team    Discussed with Dr. Bales.    Ezequiel Nunn MD   Cardiac electrophysiology fellow    I have seen, interviewed, and examined patient. I reviewed the management plan with the patient.  I have reviewed the laboratory tests, imaging, and other investigations.  Discussed with the team and agree with the findings and plan in this resident/fellow/nurse practitioner's note. In addition, changes in the physical examination, assessment and plan have been incorporated into the note  by myself, as to make it a single cohesive document. Plan was communicated to referring team/physician.      Rupa Bales MD, MS  Cardiology/Cardiac EP Attending Staff

## 2021-12-30 NOTE — PHARMACY-VANCOMYCIN DOSING SERVICE
Pharmacy Vancomycin Initial Note  Date of Service 2021  Patient's  1945  76 year old, female    Indication: Aspiration Pneumonia following cardiac arrest    Current estimated CrCl = Estimated Creatinine Clearance: 47.7 mL/min (A) (based on SCr of 1.21 mg/dL (H)).    Creatinine for last 3 days  2021: 10:19 PM Creatinine 1.21 mg/dL    Recent Vancomycin Level(s) for last 3 days  No results found for requested labs within last 72 hours.      Vancomycin IV Administrations (past 72 hours)      No vancomycin orders with administrations in past 72 hours.                Nephrotoxins and other renal medications (From now, onward)    None          Contrast Orders - past 72 hours (72h ago, onward)            None                Plan:  1. Start vancomycin 1500 mg (15 mg/kg) IV every 24 hours. General recommended post cardiac arrest dosing is 20 mg/kg IV every 24 hours for 5 days. Dose decreased to 15 mg/kg TBW given age, estimated creatinine clearance, and BMI suggesting a lower dose may be needed to avoid supratherapeutic levels.  2. Vancomycin monitoring method: Trough (Method 2 = manual dose calculation)  3. Vancomycin therapeutic monitoring goal: 15-20 mg/L  4. Pharmacy will check vancomycin levels as appropriate in 3-5 Days.    5. Serum creatinine levels will be ordered daily for the first week of therapy and at least twice weekly for subsequent weeks.      Toan Sepulveda [facility]

## 2021-12-30 NOTE — Clinical Note
Potential access sites were evaluated for patency using ultrasound.   The right internal jugular vein was selected. Access was obtained under with Sonosite guidance using a standard 21 guage needle with direct visualization of needle entry.

## 2021-12-30 NOTE — PLAN OF CARE
[facility]  Reason for [facility]/transfer: Hemodynamic monitoring and hypothermia treatment  2 RN skin assessment: completed by Chelsea Bonds, RN and Hugo Tripathi RN2  Result of skin assessment and interventions/actions: Abrations present in chest/abdomen.  Height, weight, drug calc weight: Done  Patient belongings (see Flowsheet)  MDRO education added to care planN/A  ?

## 2021-12-30 NOTE — ED PROVIDER NOTES
History     Chief Complaint   Patient presents with     Cardiac Arrest     HPI  Barbara Nelson is a 76 year old female with a history of COPD who presents for cardiac arrest.  History is obtained from EMS and later from family.  The patient was at home with her  when she said she could not catch her breath and asked for him to call 911.  She then slumped forward.  Police arrived initially and found her to have gasping respirations and applied oxygen.  EMS arrived approximately 15 minutes after this all started and found her to be in asystole.  This is when CPR was started.  She was given several rounds of good ACLS with epinephrine and bicarbonate and had return of spontaneous circulation.  She arrives intubated with an epinephrine drip running.    Allergies:  Allergies   Allergen Reactions     Lisinopril Other (See Comments)     Comment: cough, Description:      Losartan Other (See Comments)     Comment: dizziness, Description:      Latex Other (See Comments) and Rash     Comment: Itching, Description:          Problem List:    Patient Active Problem List    Diagnosis Date Noted     Cardiac arrest (H) 12/30/2021     Priority: Medium     Hammertoe 07/09/2021     Priority: Medium     Choledocholithiasis 04/13/2021     Priority: Medium     Added automatically from request for surgery [id number]       Acute pancreatitis 04/12/2021     Priority: Medium     Septic shock (H) 01/31/2018     Priority: Medium     KAYLYN (acute kidney injury) (H) 01/31/2018     Priority: Medium     Acute respiratory failure with hypoxia and hypercarbia (H) 01/31/2018     Priority: Medium     Acute encephalopathy 01/31/2018     Priority: Medium     Tobacco use disorder 06/20/2013     Priority: Medium     HTN (hypertension) 08/07/2011     Priority: Medium     Malignant neoplasm of lung (H) 08/06/2011     Priority: Medium     Hyperlipidemia 08/06/2011     Priority: Medium     Thoracic or lumbosacral neuritis or radiculitis, unspecified  08/24/2010     Priority: Medium     Pure hypercholesterolemia 11/23/2005     Priority: Medium     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 Provider to review       Depressive disorder, not elsewhere classified 11/23/2005     Priority: Medium     Female stress incontinence 11/23/2005     Priority: Medium     Anxiety state 11/23/2005     Priority: Medium     Problem list name updated by automated process. Provider to review          Past Medical History:    Past Medical History:   Diagnosis Date     Depressive disorder, not elsewhere classified      Female stress incontinence      Neoplasm of uncertain behavior of other and unspecified endocrine glands      Pure hypercholesterolemia        Past Surgical History:    Past Surgical History:   Procedure Laterality Date     ARTHROSCOPY SHOULDER RT/LT  1993    left     COLONOSCOPY  1/2005     ENDOSCOPIC RETROGRADE CHOLANGIOPANCREATOGRAM N/A 4/15/2021    Procedure: ENDOSCOPIC RETROGRADE CHOLANGIOPANCREATOGRAPHY with biliary sphincterotomy and stone removal;  Surgeon: Marshall Paz MD;  Location: [facility] OR     SURGICAL HISTORY OF -   age 8    Tonsils     SURGICAL HISTORY OF -   1980's     Breast Bx      SURGICAL HISTORY OF -   1995    Hysterectomy, Bilateral Oophorectomy       Family History:    Family History   Problem Relation Age of Onset     Depression Mother      Cancer Maternal Grandmother      Cancer Maternal Grandfather      Cancer Brother        Social History:  Marital Status:   [2]  Social History     Tobacco Use     Smoking status: Former Smoker     Packs/day: 0.50     Types: Cigarettes     Quit date: 2/4/2006     Years since quitting: 15.9     Smokeless tobacco: Never Used   Substance Use Topics     Alcohol use: Yes     Comment: occasionally     Drug use: No        Medications:    No current outpatient medications on file.        Review of  "Systems  Unable to obtain due to altered mental status    Physical Exam   BP: 128/75  Pulse: 50  Temp: 98.3  F (36.8  C)  Resp: 24  Height: 172.7 cm (5' 8\")  Weight: 101.1 kg (222 lb 14.2 oz)  SpO2: 100 %      Physical Exam  Vitals reviewed.   HENT:      Head: Atraumatic.   Eyes:      Conjunctiva/sclera: Conjunctivae normal.      Comments: Pupils 2 mm bilaterally   Cardiovascular:      Rate and Rhythm: Regular rhythm. Bradycardia present.   Pulmonary:      Comments: Intubated, rales diffusely on auscultation  Abdominal:      General: There is no distension.      Palpations: Abdomen is soft.   Skin:     General: Skin is cool.      Coloration: Skin is not cyanotic or mottled.   Neurological:      Mental Status: She is unresponsive.         ED Course                 Procedures    Results for orders placed during the hospital encounter of 12/29/21    POC US ECHO LIMITED    [facility] Procedure Note    Limited Bedside ED Cardiac Ultrasound:    PROCEDURE: PERFORMED BY: Dr. Ildefonso Anderson MD  INDICATIONS/SYMPTOM:  Post cardiac arrest  PROBE: Cardiac phased array probe  BODY LOCATION: Chest  FINDINGS:  The ultrasound was performed utilizing the parasternal long axis views.  Cardiac contractility:  Present  Gross estimation of cardiac kinesis: depressed  Pericardial Effusion:  None  RV:LV ratio: LV > RV  INTERPRETATION: Decreased cardiac function without pericardial effusion  IMAGE DOCUMENTATION: Images were archived to PACs system.            EKG Interpretation:      Interpreted by Ildefonso Anderson MD  Time reviewed: 2220  Symptoms at time of EKG: Cardiac arrest   Rhythm: 3 degree AV block  Rate: 49  Axis: Left Axis Deviation  Ectopy: none  Conduction: nonspecific interventricular conduction block  ST Segments/ T Waves: ST Segement Elevation V1, ST Segment Depression II, III, V2, V3 and V4 and T wave inversion V1, V2 and V3  Q Waves: v1  Comparison to prior: No old EKG available    Clinical " Impression: Third-degree heart block with ST elevation in V1 and T wave inversions diffusely in the precordial leads and ST depression in II, III, V2, V3, and V4        Critical Care time:  was 90 minutes for this patient excluding procedures.     The Lactic acid level is elevated due to Cardiac arrest, at this time there is no sign of severe sepsis or septic shock.         Results for orders placed or performed during the hospital encounter of 12/29/21 (from the past 24 hour(s))   POC US ECHO LIMITED    Impression    [facility] Procedure Note      Limited Bedside ED Cardiac Ultrasound:    PROCEDURE: PERFORMED BY: Dr. Ildefonso Anderson MD  INDICATIONS/SYMPTOM:  Post cardiac arrest  PROBE: Cardiac phased array probe  BODY LOCATION: Chest  FINDINGS:   The ultrasound was performed utilizing the parasternal long axis views.  Cardiac contractility:  Present  Gross estimation of cardiac kinesis: depressed  Pericardial Effusion:  None  RV:LV ratio: LV > RV  INTERPRETATION: Decreased cardiac function without pericardial effusion  IMAGE DOCUMENTATION: Images were archived to PACs system.        TSH with free T4 reflex   Result Value Ref Range    TSH 1.14 0.40 - 4.00 mU/L   Magnesium   Result Value Ref Range    Magnesium 2.0 1.6 - 2.3 mg/dL   Troponin I   Result Value Ref Range    Troponin I High Sensitivity 44 <54 ng/L   Lactic acid whole blood   Result Value Ref Range    Lactic Acid 8.8 (HH) 0.7 - 2.0 mmol/L   Comprehensive metabolic panel   Result Value Ref Range    Sodium 146 (H) 133 - 144 mmol/L    Potassium 4.0 3.4 - 5.3 mmol/L    Chloride 109 94 - 109 mmol/L    Carbon Dioxide (CO2) 21 20 - 32 mmol/L    Anion Gap 16 (H) 3 - 14 mmol/L    Urea Nitrogen 18 7 - 30 mg/dL    Creatinine 1.21 (H) 0.52 - 1.04 mg/dL    Calcium 8.4 (L) 8.5 - 10.1 mg/dL    Glucose 297 (H) 70 - 99 mg/dL    Alkaline Phosphatase 92 40 - 150 U/L    AST 41 0 - 45 U/L    ALT 29 0 - 50 U/L    Protein Total 6.2 (L) 6.8 - 8.8 g/dL    Albumin 2.4  (L) 3.4 - 5.0 g/dL    Bilirubin Total 0.4 0.2 - 1.3 mg/dL    GFR Estimate 46 (L) >60 mL/min/1.73m2   CBC with platelets differential    Narrative    The following orders were created for panel order CBC with platelets differential.  Procedure                               Abnormality         Status                     ---------                               -----------         ------                     CBC with platelets and d...[[id number]]  Abnormal            Final result               Manual Differential[[id number]]          Abnormal            Final result                 Please view results for these tests on the individual orders.   INR   Result Value Ref Range    INR 1.21 (H) 0.85 - 1.15   CBC with platelets and differential   Result Value Ref Range    WBC Count 19.7 (H) 4.0 - 11.0 10e3/uL    RBC Count 4.39 3.80 - 5.20 10e6/uL    Hemoglobin 13.3 11.7 - 15.7 g/dL    Hematocrit 42.8 35.0 - 47.0 %    MCV 98 78 - 100 fL    MCH 30.3 26.5 - 33.0 pg    MCHC 31.1 (L) 31.5 - 36.5 g/dL    RDW 13.0 10.0 - 15.0 %    Platelet Count 241 150 - 450 10e3/uL   Blood gas venous   Result Value Ref Range    pH Venous 7.14 (LL) 7.32 - 7.43    pCO2 Venous 66 (H) 40 - 50 mm Hg    pO2 Venous 53 (H) 25 - 47 mm Hg    Bicarbonate Venous 22 21 - 28 mmol/L    Base Excess/Deficit (+/-) -7.7 -7.7 - 1.9 mmol/L    FIO2 100    Manual Differential   Result Value Ref Range    % Neutrophils 37 %    % Lymphocytes 58 %    % Monocytes 3 %    % Eosinophils 2 %    % Basophils 0 %    Absolute Neutrophils 7.3 1.6 - 8.3 10e3/uL    Absolute Lymphocytes 11.4 (H) 0.8 - 5.3 10e3/uL    Absolute Monocytes 0.6 0.0 - 1.3 10e3/uL    Absolute Eosinophils 0.4 0.0 - 0.7 10e3/uL    Absolute Basophils 0.0 0.0 - 0.2 10e3/uL    RBC Morphology Confirmed RBC Indices     Platelet Assessment  Automated Count Confirmed. Platelet morphology is normal.     Automated Count Confirmed. Platelet morphology is normal.   Asymptomatic COVID-19 Virus (Coronavirus) by PCR  Nasopharyngeal    Specimen: Nasopharyngeal; Swab   Result Value Ref Range    SARS CoV2 PCR Negative Negative    Narrative    Testing was performed using the mikhail  SARS-CoV-2 & Influenza A/B Assay on the mikhail  [employer]  System.  This test should be ordered for the detection of SARS-COV-2 in individuals who meet SARS-CoV-2 clinical and/or epidemiological criteria. Test performance is unknown in asymptomatic patients.  This test is for in vitro diagnostic use under the FDA EUA for laboratories certified under CLIA to perform moderate and/or high complexity testing. This test has not been FDA cleared or approved.  A negative test does not rule out the presence of PCR inhibitors in the specimen or target RNA in concentration below the limit of detection for the assay. The possibility of a false negative should be considered if the patient's recent exposure or clinical presentation suggests COVID-19.  [facility] are certified under the Clinical Laboratory Improvement Amendments of 1988 (CLIA-88) as qualified to perform moderate and/or high complexity laboratory testing.   XR Chest Port 1 View    Narrative    EXAM: XR CHEST PORT 1 VIEW  LOCATION: [facility]  DATE/TIME: 12/29/2021 10:36 PM    INDICATION: ams, arrest  COMPARISON: 02/24/2021      Impression    IMPRESSION: Defibrillator device overlies the chest. Cardiac silhouette is mildly enlarged. Bilateral central interstitial infiltrates with perihilar airspace opacities also present on the right. Small right pleural effusion. No visible pneumothorax.   Endotracheal tube terminates about 3.5 cm above the al. Enteric tube terminates below the diaphragm. Suspected anterior fractures of the left sixth rib and the right anterior fifth rib.   Blood gas venous   Result Value Ref Range    pH Venous 7.19 (LL) 7.32 - 7.43    pCO2 Venous 64 (H) 40 - 50 mm Hg    pO2 Venous 39 25 - 47 mm Hg    Bicarbonate Venous 25 21 - 28 mmol/L     Base Excess/Deficit (+/-) -4.6 -7.7 - 1.9 mmol/L    FIO2 100    UA with Microscopic reflex to Culture    Specimen: Urine, Flores Catheter   Result Value Ref Range    Color Urine Yellow Colorless, Straw, Light Yellow, Yellow    Appearance Urine Cloudy (A) Clear    Glucose Urine >499 (A) Negative mg/dL    Bilirubin Urine Negative Negative    Ketones Urine Negative Negative mg/dL    Specific Gravity Urine 1.017 1.003 - 1.035    Blood Urine Small (A) Negative    pH Urine 5.0 5.0 - 7.0    Protein Albumin Urine >499 (A) Negative mg/dL    Urobilinogen Urine Normal Normal, 2.0 mg/dL    Nitrite Urine Negative Negative    Leukocyte Esterase Urine Negative Negative    Mucus Urine Present (A) None Seen /LPF    RBC Urine 41 (H) <=2 /HPF    WBC Urine 47 (H) <=5 /HPF    Squamous Epithelials Urine 1 <=1 /HPF    Hyaline Casts Urine 92 (H) <=2 /LPF    Narrative    Urine Culture ordered based on laboratory criteria   Urine Drugs of Abuse Screen    Narrative    The following orders were created for panel order Urine Drugs of Abuse Screen.  Procedure                               Abnormality         Status                     ---------                               -----------         ------                     Drug abuse screen 77 uri...[[id number]]  Normal              Final result                 Please view results for these tests on the individual orders.   Drug abuse screen 77 urine (FL, RH, SH)   Result Value Ref Range    Amphetamines Urine Screen Negative Screen Negative    Barbiturates Urine Screen Negative Screen Negative    Benzodiazepines Urine Screen Negative Screen Negative    Cannabinoids Urine Screen Negative Screen Negative    Cocaine Urine Screen Negative Screen Negative    Opiates Urine Screen Negative Screen Negative    PCP Urine Screen Negative Screen Negative   Blood gas venous   Result Value Ref Range    pH Venous 7.19 (LL) 7.32 - 7.43    pCO2 Venous 73 (H) 40 - 50 mm Hg    pO2 Venous 21 (L) 25 - 47 mm Hg     Bicarbonate Venous 28 21 - 28 mmol/L    Base Excess/Deficit (+/-) -1.6 -7.7 - 1.9 mmol/L    FIO2 100        Medications   lactated ringers BOLUS 1,000 mL (0 mLs Intravenous Stopped 12/30/21 0109)       Assessments & Plan (with Medical Decision Making)   76-year-old female who presents after cardiac arrest, resuscitated, intubated, on an epinephrine infusion.  She is bradycardic here.  EKG shows third-degree heart block with ST and T wave changes as above concerning for some ischemia.  I have discussed the case with the on-call cardiac intensivist and interventional physician at the [facility], Dr. Torres.  We reviewed the EKG changes together and the clinical course.  At this time no indication for emergent Cath Lab, however he has accepted the patient in transfer to their ICU.  Initial VBG is as above with acidosis and hypercarbia.  Her vent settings are titrated with increased respiratory rate and changes in PEEP and FiO2.  Her blood pressure is improved and we were able to turn off the epinephrine.  Covid swab negative.  She does have an elevated lactic acid and white blood cell count, however I think this is related to her cardiac arrest.  The family denies any recent illness and I do not believe that this represents an infectious process.  No indication for antibiotics at this time.  The patient was reassessed multiple times by myself at the bedside and was treated aggressively for post cardiac arrest cares.  She was started on fentanyl and propofol infusions for sedation.  She was transferred by EMS down to the [facility].    I have reviewed the nursing notes.    I have reviewed the findings, diagnosis, plan and need for follow up with the patient.       Discharge Medication List as of 12/30/2021  1:49 AM          Final diagnoses:   Cardiac arrest (H)       12/29/2021   [facility] EMERGENCY DEPT     Ildefonso Anderson MD  12/30/21  1545

## 2021-12-30 NOTE — SIGNIFICANT EVENT
SPIRITUAL HEALTH SERVICES Significant Event  [employer] of ANOINTING  [facility] ([place] 4e    Pt anointed by Father Ray Jordan   Pager [phone]

## 2021-12-31 NOTE — PROCEDURES
[facility]    Procedure: L radial arterial line    Date/Time: 12/31/2021 5:36 AM  Performed by: Harriet Cross MD  Authorized by: Harriet Cross MD       UNIVERSAL PROTOCOL   Site Marked: Yes  Prior Images Obtained and Reviewed:  Yes  Required items: Required blood products, implants, devices and special equipment available    Patient identity confirmed:  Arm band  NA - No sedation, light sedation, or local anesthesia  Confirmation Checklist:  Patient's identity using two indicators, procedure was appropriate and matched the consent or emergent situation and correct equipment/implants were available  Time out: Immediately prior to the procedure a time out was called    Universal Protocol: the Joint Commission Universal Protocol was followed    Preparation: Patient was prepped and draped in usual sterile fashion    ESBL (mL):  6    SEDATION    Patient Sedated: No      PROCEDURE  Describe Procedure: Used ultrasound to visualize radial artery; used needle for access, followed Seldinger technique to insert arterial line catheter. Good waveform was confirmed on the monitor. Single suture was used to secure catheter in place. The area was cleansed. Biopatch placed. Tegaderm secured.   Patient Tolerance:  Patient tolerated the procedure well with no immediate complications  Length of time physician/provider present for 1:1 monitoring during sedation: 15

## 2021-12-31 NOTE — PLAN OF CARE
Major Shift Events:     Pt fully rewarmed by 1130. Thermaguard remains on and in place to keep pt normothermic. Sedation turned off this morning. Pt occasionally blinks, otherwise has upward gaze. No response to stimuli, does not follow or track. Occasional myoclonic jerks of extremities. HR 80-130s, in and out of sinus tach vs aflutter. Rarely paced via TV pacer. Nicardipine gtt titrated to keep SBP <130. Vent settings unchanged. Plan to start tube feeds tmrw. Abdomen distended, but soft. Adequate UOP, lasix 20mg given. Sub-q hep restarted.     Plan: monitor neurological status. Control BP/MAP with nicardipine gtt. Daughter at bedside for part of the day and updated.       For vital signs and complete assessments, please see documentation flowsheets.     Problem: Body Temperature Regulation (Targeted Temperature Management)  Goal: Target Body Temperature Maintained  Outcome: Improving  Intervention: Maintain Target Body Temperature  Recent Flowsheet Documentation  Taken 12/31/2021 0800 by Radha De La Rosa RN  Pain Management Interventions: around-the-clock dosing utilized     Problem: Hemodynamic Instability (Targeted Temperature Management)  Goal: Effective Tissue Perfusion  Outcome: Improving

## 2021-12-31 NOTE — PROGRESS NOTES
Cardiology ICU Progress Note    Brief HPI: Barbara Nelson is a 76 year old female with PMHx of GOLD stage II COPD, hx of non-small cell lung cancer s/p right lobectomy in 2006, stable lung nodule, hypertension, anxiety & peripheral neuropathy who presents as a transfer from the [facility] ED to the [facility] CICU on 12/30/2021 s/p cardiac arrest.     The patient was reportedly watching TV when she complained of sudden onset shortness of breath. She started a nebulizer treatment- however half way through collapsed (witnessed by ). He administered breaths (but no compressions). EMS arrived on the scene approximately 15 minutes s/p arrest and found the patient to be pulseless and agonal breathing. Initial rhythm was non-shockable. CPR started, 3 rounds of epinephrine given and ROSC achieved after approximately 10-12 mins of ACLS. Patient initiated on an epinephrine drip, given 50 mEq bicarb and intubated on the field. On arrival to the [facility] ED, she was noted to be bradycardic and patinet was then transferred to [facility] for further care. Upon arrival to [facility] patient remained bradycardic for which EP consulted and tep TVP placed for CHB. A thermogard cooling catheter was placed and cooling was initiated to 34C. Warm since 12/31. Per daughter, similar event occurred 3 years ago at [place] with no etiology found.     Subjective and Interval: Yesterday, possible myoclonic jerking, vEEG initiated, stopped Propofol yesterday, restarted overnight d/t vent dyssynchrony, temp pacing wire placed, CT's done; CAP revealed mild ileus, prominent appendix GGO, pl effusions. Head CT with possible acute infarctions of the bilateral basal ganglia and thalami in the right cerebellum with concern for possible diffuse cerebral hypoxic ischemic injury.    Assessment and plan by system:   Today's changes:  Bowel regimen  Start feeding tomorrow  Start Subcutaneous Heparin  Stop Sedation now  Touch base with NCC regarding  EEG/CT  Nicardipine goal to keep SBP < 130  Add Hydralazine if needed for additional BP control  Diurese to keep euvolemic; start with lasix 20 mg IV now  EKG for ? aflutter     Neurology   # Concern for hypoxic brain injury  # Seizure-like activity   Intubated and sedated. Cooled to 34 degrees. Warm since 12/31 am. Did have apparent seizure like activity on arrival which improved with benzodiazepines. 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 According to daughter, similar event occurred at [place] 3 years ago. WIll obtain those records. CHB on arrival, EP consulted and temp TVP placed. Echo with normal biventricular function, EF of 55-60%, abnormal septal motion, (has pacer) normal RV, no effusion    Vasopressors/antiarrhythmics/inotropes:  - Intermittently requiring Nicardipine to Keep SBP < 130  - Add PO Hydralazine  - Intermittently tachycardic, intermittently pacing. EKG now.     Pulmonary  # Acute hypoxic respiratory failure  # Pulmonary edema   # Pleural effusions  # Possible aspiration pneumonia  Vent settings: CMV 20/500/8/40. ABG 7.32/48/121/25  - Broad-spectrum antibiotics and sputum cultures (see ID plan).  - Wean vent as able.  - Daily CXR. Todays w ETT slightly high, stable opacities and effusions  - Q6h ABGs for now  - Intermittent diuresis     Gastrointestinal, Nutrition  # Mild ileus  # NPO for hypothermia  LFTS nearly normal AST 48  - CT Chest, abdomen, pelvis with prominent appendix, GGO, infection  - NPO today, nutrition consult tomorrow  - Bowel regimen  Start today  - GI prophylaxis: Protonix  "40 mg IV daily.     Renal, Electrolytes  # KAYLYN  # Lactic acidosis  # Hypoalbuminemia  Lactic improving 2 (2.4)  Creatinine improved 1.06 (1.39)  700 ml UOP yesterday, 491 since midnight, net even goal today  - Intermittent diuresis- 20 IV now to keep euvolemic  - Monitor urine output/Flores for strict I/O's      Infectious Disease  # Aspiration pneumonia  # Leukocytosis  WBC improved 18 (26.4) No temp given thermogard.   - cefepime x5 days for aspiration pneumonia.   - stop vancomycin as MRSA is negative  - Cultures pending     Hematology  # Anemia  HGB 12 (13)  (275)   - Transfuse for HGB <8  - DVT prophylaxis: heparin subcutaneous start now     Endocrinology  # Hyperglycemia  No known medical history. BG elevated in setting of critical illness. HbA1c: 5.6% 3 months ago  Mo insulin required as of now     MSK/Skin  No active issues.     Pertinent Lines  L femoral Thermogard December 30, 2021  R radial arterial line December 30, 2021  ETT December 29, 2021  Flores catheter December 30, 2021  OG tube December 30, 2021    Integumentary:  - No skin issues    Patient seen and discussed with staff physician.    CRISTELA Mann, CNP  [facility]  Interventional Cardiology-CSI Service  Pager [phone]     Objective:  Most recent vital signs:  /48   Pulse 82   Temp (!) 96.1  F (35.6  C)   Resp 24   Ht 1.765 m (5' 9.5\")   Wt 104.5 kg (230 lb 6.1 oz)   SpO2 100%   BMI 33.53 kg/m    Temp:  [93.2  F (34  C)-96.3  F (35.7  C)] 96.1  F (35.6  C)  Pulse:  [] 82  Resp:  [12-28] 24  BP: (109-135)/(48-59) 132/48  MAP:  [65 mmHg-108 mmHg] 76 mmHg  Arterial Line BP: ()/(39-89) 140/43  FiO2 (%):  [40 %-80 %] 40 %  SpO2:  [89 %-100 %] 100 %  Wt Readings from Last 2 Encounters:   12/31/21 104.5 kg (230 lb 6.1 oz)   12/30/21 101.1 kg (222 lb 14.2 oz)       Intake/Output Summary (Last 24 hours) at 12/31/2021 0510  Last data filed at 12/31/2021 0700  Gross per 24 hour   Intake " 1594.74 ml   Output 1563 ml   Net 31.74 ml     Physical exam:  General: In bed, in NAD  HEENT: PERRL, no scleral icterus or injection  CARDIAC: RRR, no m/r/g appreciated. Peripheral pulses dopplered  RESP: Mechanical ventilation; CTAB, no wheezes, rhonchi or crackles appreciated.  GI: soft, BS hypoactive  : Flores  EXTREMITIES: Trace LE edema, pulses 2+. Femoral access site w/o bleeding, dressing c/d/i.   SKIN: No acute lesions appreciated  NEURO: Intubated and sedated    Labs (Past three days):  CBC  Recent Labs   Lab 12/31/21  0352 12/30/21  1416 12/30/21  0424 12/29/21  2219   WBC 18.5* 21.1* 26.4* 19.7*   RBC 4.06 4.42 4.43 4.39   HGB 12.0 13.2 13.3 13.3   HCT 37.5 40.9 41.9 42.8   MCV 92 93 95 98   MCH 29.6 29.9 30.0 30.3   MCHC 32.0 32.3 31.7 31.1*   RDW 13.5 13.4 13.3 13.0    202 275 241     BMP  Recent Labs   Lab 12/31/21  0352 12/31/21  0351 12/31/21  0002 12/30/21  2056 12/30/21  1553 12/30/21  1416 12/30/21  0426 12/30/21  0424 12/29/21  2219     --   --   --   --  144  --  144 146*   POTASSIUM 4.2  --   --   --   --  3.8  --  3.9 4.0   CHLORIDE 107  --   --   --   --  112*  --  112* 109   CO2 27  --   --   --   --  24  --  23 21   ANIONGAP 9  --   --   --   --  8  --  9 16*   * 110* 106* 124*   < > 116*   < > 167* 297*   BUN 36*  --   --   --   --  31*  --  24 18   CR 1.06*  --   --   --   --  1.18*  --  1.39* 1.21*   GFRESTIMATED 54*  --   --   --   --  47*  --  39* 46*   MIKE 8.4*  --   --   --   --  8.4*  --  8.7 8.4*   MAG  --   --   --   --   --   --   --  1.9 2.0    < > = values in this interval not displayed.     Troponins:     INR  Recent Labs   Lab 12/30/21  1416 12/30/21  0424 12/29/21  2219   INR 1.27* 1.21* 1.21*     Liver panel  Recent Labs   Lab 12/31/21  0352 12/30/21  1416 12/30/21  0424 12/29/21  2219   PROTTOTAL 6.2* 6.5* 6.5* 6.2*   ALBUMIN 2.7* 2.7* 2.8* 2.4*   BILITOTAL 1.2 1.1 0.7 0.4   ALKPHOS 83 85 89 92   AST 48* 47* 46* 41   ALT 31 32 35 29        Imaging/procedure results:  XR Chest Port 1 View  Narrative: EXAM: XR Chest 1 view 12/31/2021 1:15 AM      HISTORY: ETT, line placement.    COMPARISON: Previous day.     TECHNIQUE: Frontal view of the chest.    FINDINGS: ET tube is 5.0 cm from the al. An esophageal temperature  probe with tip in the mid esophagus. Enteric tube is subdiaphragmatic  with tip collimated from the study. Right IJ temporary ICD with lead  in the right ventricle. Defibrillator pads in place.  Trachea is midline. Cardiomediastinal silhouette is within normal  limits. Stable bilateral interstitial pulmonary opacities. Moderate  right pleural effusion. Small left pleural effusion. No  pneumothoraces. No acute osseous abnormalities.  Impression: IMPRESSION:   1. ET tube is 5.0 cm from the al.  2. Right IJ temporary ICD with lead in the right ventricle.  3. Stable bilateral interstitial pulmonary opacities.  4. Stable moderate right pleural effusion and small left pleural  effusion.    I have personally reviewed the examination and initial interpretation  and I agree with the findings.    JOSE GUADALUPE PINEDA MD         SYSTEM ID:  [id number]

## 2021-12-31 NOTE — PLAN OF CARE
Changes this shift: Continues to have myoclonic episodes overnight. Propofol gtt started to assist with vent dyssynchrony. Fentanyl continues at 100. No need for nicardipine overnight, MAP<110. Low urine output, MD aware. Lactic trending up, MD aware. Rewarming started at 0530. Abdomen distended and firm, CT showed possible illeus, MD aware.     Plan: Continue to monitor and notify MD with pertinent changes.

## 2021-12-31 NOTE — PLAN OF CARE
No response to stimuli. See sedation and pain medication titration with neuro crits recommendation. See flowsheets for neuro assessment. SR/ST with occasional ectopy. Temp pacer VVI. See flowsheets for nicardipine titration. VSS. :20:8:60%. Lungs coarse with minimal secretions from ETT. Low UOP. Fluid challenge x 2 with little response. OG to suction. See flowsheets for I & Os. Family updated via phone. [place] with current POC.

## 2021-12-31 NOTE — PROGRESS NOTES
Neuroscience Intensive Care Progress Note    REASON FOR CRITICAL CARE ADMISSION:  Cardiac Arrest     Date of Service:  2021  Date of Admission:  2021  Hospital Day: 2    24 hour events:  Pt continued to have brief episodes of what appeared to be myoclonic jerks overnight with EEG correlates of polyspike waves. Attempted to differentiate between the presence of myoclonic status epilepticus and myoclonic jerks by giving a small dose of a paralytic, which showed no change in EEG. However, spoke with attending Epileptologist who reported that pt's EEG pattern corresponded more closely with an atypical burst-suppression pattern suggestive of profound encephalopathy rather than seizure. As such, recommended that pt be kept off all sedation all overnight to try to determine how much of her EEG/exam was due to medication effect. As such, pt has been off Midazolam since 7PM    Overnight, pt was also aple to come of all pressors. However, concern has been raised for ileus, with elevated lactate and tense abdomen    24 Hour Vital Signs Summary:  Temp: 98.8  F (37.1  C) Temp  Min: 93.4  F (34.1  C)  Max: 99.1  F (37.3  C)  Resp: 21 Resp  Min: 18  Max: 28  SpO2: 98 % SpO2  Min: 96 %  Max: 100 %  Pulse: 87 Pulse  Min: 69  Max: 117    No data recorded  BP: 132/48 Systolic (24hrs), Av , Min:109 , Max:135   Diastolic (24hrs), Av, Min:48, Max:59        Respiratory monitoring:   Ventilation Mode: CMV/AC  (Continuous Mandatory Ventilation/ Assist Control)  FiO2 (%): 40 %  Rate Set (breaths/minute): 20 breaths/min  Tidal Volume Set (mL): 500 mL  PEEP (cm H2O): 8 cmH2O  Oxygen Concentration (%): 40 %  Resp: 21      I/O last 3 completed shifts:  In: 1690.75 [I.V.:1040.75; NG/GT:150; IV Piggyback:500]  Out: 1651 [Urine:1101; Emesis/NG output:550]    Current Medications:    ceFEPIme (MAXIPIME) IV  2 g Intravenous Q24H     heparin ANTICOAGULANT  5,000 Units Subcutaneous Q8H     ipratropium - albuterol 0.5 mg/2.5 mg/3 mL   3 mL Nebulization Q4H     pantoprazole  40 mg Per Feeding Tube QAM AC    Or     pantoprazole (PROTONIX) IV  40 mg Intravenous QAM AC     polyethylene glycol  17 g Oral Daily     senna-docusate  1 tablet Oral At Bedtime       PRN Medications:  acetaminophen **OR** acetaminophen, dextrose, glucose **OR** dextrose **OR** glucagon, fentaNYL, propofol (DIPRIVAN) infusion **AND** propofol **AND** CK total **AND** Triglycerides **AND** - MEDICATION INSTRUCTIONS - **AND** Notify Physician, naloxone **OR** naloxone **OR** naloxone **OR** naloxone, polyethylene glycol, senna-docusate **OR** senna-docusate    Infusions:    dextrose       fentaNYL Stopped (12/31/21 1138)     insulin regular Stopped (12/30/21 1713)     propofol (DIPRIVAN) infusion Stopped (12/31/21 1138)    And     - MEDICATION INSTRUCTIONS -       midazolam Stopped (12/30/21 1815)     niCARdipine 10 mg/hr (12/31/21 1600)       Allergies   Allergen Reactions     Lisinopril Other (See Comments)     Comment: cough, Description:      Losartan Other (See Comments)     Comment: dizziness, Description:      Latex Other (See Comments) and Rash     Comment: Itching, Description:          Physical Examination:  General: Adult female patient, lying in bed, NAD  HEENT: Normocephalic/Atraumatic, no icterus  Cardiac: RRR  Chest: Intubated on mechanical ventilation  Abdomen: Tense  Extremities: Warm, no edema  Skin: No rash or lesion     Neuro:  Mental status: Pt does not wake to either verbal, light touch, or noxious stimuli. Does not open eyes spontaneously or to command. Does not track or follow any command  Cranial nerves: PERRL. Absent cough reflex   Motor: Normal bulk and tone. Fails to respond to noxious stimuli in any extremity. Does have intermittent brief episodes of a few body jerk about once every 45s - 1min that appear as myoclonus  Sensory: Fails to respond to noxious stimuli in any extremity  Coordination: Deferred  Gait: Deferred    Labs/Studies:  BMP  Recent  Labs   Lab 12/31/21  0750 12/31/21  0352 12/30/21  1416 12/30/21  0424 12/29/21  2219   NA  --  143 144 144 146*   POTASSIUM 4.0 4.2 3.8 3.9 4.0   CHLORIDE  --  107 112* 112* 109   CO2  --  27 24 23 21   BUN  --  36* 31* 24 18   CR  --  1.06* 1.18* 1.39* 1.21*   MIKE  --  8.4* 8.4* 8.7 8.4*       CBC  Recent Labs   Lab 12/31/21  0352 12/30/21  1416 12/30/21  0424 12/29/21  2219   WBC 18.5* 21.1* 26.4* 19.7*   HGB 12.0 13.2 13.3 13.3    202 275 241       ABG  Recent Labs   Lab 12/31/21  0536 12/31/21  0003 12/30/21  2240 12/30/21  2057   PH 7.32* 7.31* 7.34* 7.29*   PCO2 48* 51* 46* 52*   PO2 121* 170* 115* 117*   HCO3 25 26 25 25         Imaging:  CTH (12/30/2021)  Impression:   1. Questionable small hypodense foci scattered throughout the  bilateral basal ganglia, thalami and right cerebellum are new from  1/30/2018 and may represent acute infarctions. Mildly indistinct  cerebral gray-white matter differentiation diffusely is concerning for  hypoxic ischemic insult. Consider MRI to further evaluate.  2. No acute intracranial hemorrhage    All relevant imaging and laboratory values personally reviewed    Assessment/Plan  Barbara Nelson is a 76 year old female who presented on 12/30/2021 with cardiac arrest. The NCC service was consulted to evaluate for prognostication. While initial CTH showed mild grey-white differentiation loss, the pt's atypical burst suppression seen on EEG is quite concerning for severe hypoxic brain injury. This is reflected in the continuation of a poorly responsive neurologic exam despite cessation of sedating medications. Given this, the pt's neurologic and functional recovery is suspected to be rather limited and pt will likely require extensive assistance to perform even the most basic of functions. Will continue to follow     Recommendations:  - vEEG  - No need to start anti-seizure medication at this time  - Would prefer to perform an MRI Brain for further clarification of  ischemic damage, but cannot be performed while pt's pacer is in place  - Avoid hypotonic solutions as they worsen cerebral edema  - Avoid nitroprusside/nitroglycerin as able - can increase ICP's   - When safe to do so, limitation of CNS acting medications will permit a more accurate neurological assessment  - Continue to wean sedation as able  - Will follow neurologic examination once rewarmed and weaned of sedation  - The neurocritical care service will continue to follow    The patient was seen and discussed with the NCC attending, Dr. Ramirez.    Charlette Doyle MD  Neurology PGY2  [phone]  12/31/2021

## 2021-12-31 NOTE — PROGRESS NOTES
INITIAL REPORT of Video-EEG Monitoring              DATE OF RECORDIN2021         I reviewed the first 2 hours of video-EEG monitoring of Barbara Nelson.         The EEG during sedated coma was abnormal due to an atypical burst-suppression pattern, with prolonged generalized suppressions and short bursts of generalized polyspike-wave discharges; brief myoclonic jerks were synchronized to the polyspike-wave discharges.    No electrographic seizures were observed.         These abnormalities indicate profound electrographic encephalopathy, and an elevated risk of epileptic seizures.    This recording excludes non-convulsive status epilepticus as a possible cause of this encephalopathy.    Tomi Gomez M.D.

## 2022-01-01 ENCOUNTER — APPOINTMENT (OUTPATIENT)
Dept: NEUROLOGY | Facility: CLINIC | Age: 77
DRG: 260 | End: 2022-01-01
Attending: INTERNAL MEDICINE
Payer: COMMERCIAL

## 2022-01-01 ENCOUNTER — APPOINTMENT (OUTPATIENT)
Dept: GENERAL RADIOLOGY | Facility: CLINIC | Age: 77
DRG: 260 | End: 2022-01-01
Attending: INTERNAL MEDICINE
Payer: COMMERCIAL

## 2022-01-01 ENCOUNTER — HEALTH MAINTENANCE LETTER (OUTPATIENT)
Age: 77
End: 2022-01-01

## 2022-01-01 ENCOUNTER — APPOINTMENT (OUTPATIENT)
Dept: GENERAL RADIOLOGY | Facility: CLINIC | Age: 77
DRG: 260 | End: 2022-01-01
Attending: NURSE PRACTITIONER
Payer: COMMERCIAL

## 2022-01-01 VITALS
WEIGHT: 220.9 LBS | SYSTOLIC BLOOD PRESSURE: 131 MMHG | RESPIRATION RATE: 17 BRPM | TEMPERATURE: 101.3 F | HEIGHT: 70 IN | DIASTOLIC BLOOD PRESSURE: 57 MMHG | BODY MASS INDEX: 31.62 KG/M2

## 2022-01-01 LAB
ABO/RH(D): NORMAL
ALBUMIN SERPL-MCNC: 2.3 G/DL (ref 3.4–5)
ALBUMIN SERPL-MCNC: 2.4 G/DL (ref 3.4–5)
ALBUMIN SERPL-MCNC: 2.5 G/DL (ref 3.4–5)
ALBUMIN SERPL-MCNC: 2.6 G/DL (ref 3.4–5)
ALBUMIN SERPL-MCNC: 2.6 G/DL (ref 3.4–5)
ALP SERPL-CCNC: 108 U/L (ref 40–150)
ALP SERPL-CCNC: 85 U/L (ref 40–150)
ALP SERPL-CCNC: 89 U/L (ref 40–150)
ALP SERPL-CCNC: 94 U/L (ref 40–150)
ALP SERPL-CCNC: 97 U/L (ref 40–150)
ALT SERPL W P-5'-P-CCNC: 27 U/L (ref 0–50)
ALT SERPL W P-5'-P-CCNC: 27 U/L (ref 0–50)
ALT SERPL W P-5'-P-CCNC: 30 U/L (ref 0–50)
ALT SERPL W P-5'-P-CCNC: 35 U/L (ref 0–50)
ALT SERPL W P-5'-P-CCNC: 36 U/L (ref 0–50)
ANION GAP SERPL CALCULATED.3IONS-SCNC: 10 MMOL/L (ref 3–14)
ANION GAP SERPL CALCULATED.3IONS-SCNC: 12 MMOL/L (ref 3–14)
ANION GAP SERPL CALCULATED.3IONS-SCNC: 14 MMOL/L (ref 3–14)
ANION GAP SERPL CALCULATED.3IONS-SCNC: 19 MMOL/L (ref 3–14)
ANION GAP SERPL CALCULATED.3IONS-SCNC: 20 MMOL/L (ref 3–14)
ANTIBODY SCREEN: NEGATIVE
AST SERPL W P-5'-P-CCNC: 106 U/L (ref 0–45)
AST SERPL W P-5'-P-CCNC: 164 U/L (ref 0–45)
AST SERPL W P-5'-P-CCNC: 181 U/L (ref 0–45)
AST SERPL W P-5'-P-CCNC: 68 U/L (ref 0–45)
AST SERPL W P-5'-P-CCNC: 78 U/L (ref 0–45)
ATRIAL RATE - MUSE: 93 BPM
ATRIAL RATE - MUSE: 97 BPM
BACTERIA ASPIRATE CULT: NORMAL
BASE EXCESS BLDA CALC-SCNC: -0.6 MMOL/L (ref -9–1.8)
BASE EXCESS BLDA CALC-SCNC: -1.2 MMOL/L (ref -9–1.8)
BASE EXCESS BLDA CALC-SCNC: -10.1 MMOL/L (ref -9–1.8)
BASE EXCESS BLDA CALC-SCNC: -10.5 MMOL/L (ref -9–1.8)
BASE EXCESS BLDA CALC-SCNC: -10.9 MMOL/L (ref -9–1.8)
BASE EXCESS BLDA CALC-SCNC: -4.2 MMOL/L (ref -9–1.8)
BASE EXCESS BLDA CALC-SCNC: -6.2 MMOL/L (ref -9–1.8)
BASE EXCESS BLDA CALC-SCNC: -6.2 MMOL/L (ref -9–1.8)
BASE EXCESS BLDA CALC-SCNC: -7.6 MMOL/L (ref -9–1.8)
BASE EXCESS BLDA CALC-SCNC: -8.6 MMOL/L (ref -9–1.8)
BASE EXCESS BLDA CALC-SCNC: -8.8 MMOL/L (ref -9–1.8)
BASE EXCESS BLDA CALC-SCNC: -8.9 MMOL/L (ref -9–1.8)
BASE EXCESS BLDA CALC-SCNC: -9.1 MMOL/L (ref -9–1.8)
BASE EXCESS BLDA CALC-SCNC: -9.6 MMOL/L (ref -9–1.8)
BASE EXCESS BLDA CALC-SCNC: -9.9 MMOL/L (ref -9–1.8)
BASE EXCESS BLDV CALC-SCNC: -0.2 MMOL/L (ref -7.7–1.9)
BILIRUB SERPL-MCNC: 1.1 MG/DL (ref 0.2–1.3)
BILIRUB SERPL-MCNC: 1.2 MG/DL (ref 0.2–1.3)
BILIRUB SERPL-MCNC: 1.5 MG/DL (ref 0.2–1.3)
BUN SERPL-MCNC: 38 MG/DL (ref 7–30)
BUN SERPL-MCNC: 50 MG/DL (ref 7–30)
BUN SERPL-MCNC: 54 MG/DL (ref 7–30)
BUN SERPL-MCNC: 59 MG/DL (ref 7–30)
BUN SERPL-MCNC: 64 MG/DL (ref 7–30)
CALCIUM SERPL-MCNC: 8.4 MG/DL (ref 8.5–10.1)
CALCIUM SERPL-MCNC: 8.5 MG/DL (ref 8.5–10.1)
CALCIUM SERPL-MCNC: 8.9 MG/DL (ref 8.5–10.1)
CALCIUM SERPL-MCNC: 9 MG/DL (ref 8.5–10.1)
CALCIUM SERPL-MCNC: 9.5 MG/DL (ref 8.5–10.1)
CHLORIDE BLD-SCNC: 108 MMOL/L (ref 94–109)
CHLORIDE BLD-SCNC: 110 MMOL/L (ref 94–109)
CHLORIDE BLD-SCNC: 111 MMOL/L (ref 94–109)
CHLORIDE BLD-SCNC: 114 MMOL/L (ref 94–109)
CHLORIDE BLD-SCNC: 118 MMOL/L (ref 94–109)
CO2 SERPL-SCNC: 14 MMOL/L (ref 20–32)
CO2 SERPL-SCNC: 15 MMOL/L (ref 20–32)
CO2 SERPL-SCNC: 18 MMOL/L (ref 20–32)
CO2 SERPL-SCNC: 21 MMOL/L (ref 20–32)
CO2 SERPL-SCNC: 22 MMOL/L (ref 20–32)
CREAT SERPL-MCNC: 1.01 MG/DL (ref 0.52–1.04)
CREAT SERPL-MCNC: 1.06 MG/DL (ref 0.52–1.04)
CREAT SERPL-MCNC: 1.18 MG/DL (ref 0.52–1.04)
CREAT SERPL-MCNC: 1.21 MG/DL (ref 0.52–1.04)
CREAT SERPL-MCNC: 1.24 MG/DL (ref 0.52–1.04)
DIASTOLIC BLOOD PRESSURE - MUSE: NORMAL MMHG
DIASTOLIC BLOOD PRESSURE - MUSE: NORMAL MMHG
ERYTHROCYTE [DISTWIDTH] IN BLOOD BY AUTOMATED COUNT: 14 % (ref 10–15)
ERYTHROCYTE [DISTWIDTH] IN BLOOD BY AUTOMATED COUNT: 14.3 % (ref 10–15)
ERYTHROCYTE [DISTWIDTH] IN BLOOD BY AUTOMATED COUNT: 14.3 % (ref 10–15)
ERYTHROCYTE [DISTWIDTH] IN BLOOD BY AUTOMATED COUNT: 14.4 % (ref 10–15)
ERYTHROCYTE [DISTWIDTH] IN BLOOD BY AUTOMATED COUNT: 14.5 % (ref 10–15)
GFR SERPL CREATININE-BSD FRML MDRD: 44 ML/MIN/1.73M2
GFR SERPL CREATININE-BSD FRML MDRD: 46 ML/MIN/1.73M2
GFR SERPL CREATININE-BSD FRML MDRD: 47 ML/MIN/1.73M2
GFR SERPL CREATININE-BSD FRML MDRD: 54 ML/MIN/1.73M2
GFR SERPL CREATININE-BSD FRML MDRD: 57 ML/MIN/1.73M2
GLUCOSE BLD-MCNC: 101 MG/DL (ref 70–99)
GLUCOSE BLD-MCNC: 109 MG/DL (ref 70–99)
GLUCOSE BLD-MCNC: 117 MG/DL (ref 70–99)
GLUCOSE BLD-MCNC: 126 MG/DL (ref 70–99)
GLUCOSE BLD-MCNC: 148 MG/DL (ref 70–99)
GLUCOSE BLDC GLUCOMTR-MCNC: 106 MG/DL (ref 70–99)
GLUCOSE BLDC GLUCOMTR-MCNC: 107 MG/DL (ref 70–99)
GLUCOSE BLDC GLUCOMTR-MCNC: 110 MG/DL (ref 70–99)
GLUCOSE BLDC GLUCOMTR-MCNC: 114 MG/DL (ref 70–99)
GLUCOSE BLDC GLUCOMTR-MCNC: 118 MG/DL (ref 70–99)
GLUCOSE BLDC GLUCOMTR-MCNC: 124 MG/DL (ref 70–99)
GLUCOSE BLDC GLUCOMTR-MCNC: 86 MG/DL (ref 70–99)
GLUCOSE BLDC GLUCOMTR-MCNC: 98 MG/DL (ref 70–99)
HCO3 BLD-SCNC: 14 MMOL/L (ref 21–28)
HCO3 BLD-SCNC: 14 MMOL/L (ref 21–28)
HCO3 BLD-SCNC: 15 MMOL/L (ref 21–28)
HCO3 BLD-SCNC: 16 MMOL/L (ref 21–28)
HCO3 BLD-SCNC: 17 MMOL/L (ref 21–28)
HCO3 BLD-SCNC: 19 MMOL/L (ref 21–28)
HCO3 BLD-SCNC: 20 MMOL/L (ref 21–28)
HCO3 BLD-SCNC: 24 MMOL/L (ref 21–28)
HCO3 BLD-SCNC: 24 MMOL/L (ref 21–28)
HCO3 BLDV-SCNC: 26 MMOL/L (ref 21–28)
HCT VFR BLD AUTO: 31.2 % (ref 35–47)
HCT VFR BLD AUTO: 32.5 % (ref 35–47)
HCT VFR BLD AUTO: 32.8 % (ref 35–47)
HCT VFR BLD AUTO: 32.9 % (ref 35–47)
HCT VFR BLD AUTO: 34 % (ref 35–47)
HGB BLD-MCNC: 10.3 G/DL (ref 11.7–15.7)
HGB BLD-MCNC: 10.3 G/DL (ref 11.7–15.7)
HGB BLD-MCNC: 10.4 G/DL (ref 11.7–15.7)
HGB BLD-MCNC: 10.7 G/DL (ref 11.7–15.7)
HGB BLD-MCNC: 9.8 G/DL (ref 11.7–15.7)
INTERPRETATION ECG - MUSE: NORMAL
INTERPRETATION ECG - MUSE: NORMAL
LACTATE SERPL-SCNC: 0.9 MMOL/L (ref 0.7–2)
LACTATE SERPL-SCNC: 1 MMOL/L (ref 0.7–2)
LACTATE SERPL-SCNC: 1.2 MMOL/L (ref 0.7–2)
LACTATE SERPL-SCNC: 1.2 MMOL/L (ref 0.7–2)
LACTATE SERPL-SCNC: 1.3 MMOL/L (ref 0.7–2)
LACTATE SERPL-SCNC: 1.4 MMOL/L (ref 0.7–2)
LACTATE SERPL-SCNC: 1.9 MMOL/L (ref 0.7–2)
LACTATE SERPL-SCNC: 1.9 MMOL/L (ref 0.7–2)
LACTATE SERPL-SCNC: 2.6 MMOL/L (ref 0.7–2)
MAGNESIUM SERPL-MCNC: 2.7 MG/DL (ref 1.6–2.3)
MAGNESIUM SERPL-MCNC: 3.3 MG/DL (ref 1.6–2.3)
MAGNESIUM SERPL-MCNC: 3.4 MG/DL (ref 1.6–2.3)
MCH RBC QN AUTO: 29.5 PG (ref 26.5–33)
MCH RBC QN AUTO: 29.6 PG (ref 26.5–33)
MCH RBC QN AUTO: 29.8 PG (ref 26.5–33)
MCH RBC QN AUTO: 29.9 PG (ref 26.5–33)
MCH RBC QN AUTO: 29.9 PG (ref 26.5–33)
MCHC RBC AUTO-ENTMCNC: 31.4 G/DL (ref 31.5–36.5)
MCHC RBC AUTO-ENTMCNC: 31.4 G/DL (ref 31.5–36.5)
MCHC RBC AUTO-ENTMCNC: 31.5 G/DL (ref 31.5–36.5)
MCHC RBC AUTO-ENTMCNC: 31.6 G/DL (ref 31.5–36.5)
MCHC RBC AUTO-ENTMCNC: 31.7 G/DL (ref 31.5–36.5)
MCV RBC AUTO: 94 FL (ref 78–100)
MCV RBC AUTO: 95 FL (ref 78–100)
O2/TOTAL GAS SETTING VFR VENT: 40 %
O2/TOTAL GAS SETTING VFR VENT: 50 %
O2/TOTAL GAS SETTING VFR VENT: 60 %
OXYHGB MFR BLD: 96 % (ref 92–100)
OXYHGB MFR BLDV: 68 % (ref 70–75)
P AXIS - MUSE: 111 DEGREES
P AXIS - MUSE: 4 DEGREES
PCO2 BLD: 25 MM HG (ref 35–45)
PCO2 BLD: 26 MM HG (ref 35–45)
PCO2 BLD: 27 MM HG (ref 35–45)
PCO2 BLD: 28 MM HG (ref 35–45)
PCO2 BLD: 29 MM HG (ref 35–45)
PCO2 BLD: 29 MM HG (ref 35–45)
PCO2 BLD: 31 MM HG (ref 35–45)
PCO2 BLD: 33 MM HG (ref 35–45)
PCO2 BLD: 35 MM HG (ref 35–45)
PCO2 BLD: 36 MM HG (ref 35–45)
PCO2 BLD: 38 MM HG (ref 35–45)
PCO2 BLD: 39 MM HG (ref 35–45)
PCO2 BLDV: 45 MM HG (ref 40–50)
PH BLD: 7.3 [PH] (ref 7.35–7.45)
PH BLD: 7.31 [PH] (ref 7.35–7.45)
PH BLD: 7.32 [PH] (ref 7.35–7.45)
PH BLD: 7.33 [PH] (ref 7.35–7.45)
PH BLD: 7.34 [PH] (ref 7.35–7.45)
PH BLD: 7.34 [PH] (ref 7.35–7.45)
PH BLD: 7.36 [PH] (ref 7.35–7.45)
PH BLD: 7.38 [PH] (ref 7.35–7.45)
PH BLD: 7.4 [PH] (ref 7.35–7.45)
PH BLD: 7.4 [PH] (ref 7.35–7.45)
PH BLD: 7.43 [PH] (ref 7.35–7.45)
PH BLDV: 7.36 [PH] (ref 7.32–7.43)
PHOSPHATE SERPL-MCNC: 3.3 MG/DL (ref 2.5–4.5)
PHOSPHATE SERPL-MCNC: 3.7 MG/DL (ref 2.5–4.5)
PHOSPHATE SERPL-MCNC: 4.4 MG/DL (ref 2.5–4.5)
PLATELET # BLD AUTO: 149 10E3/UL (ref 150–450)
PLATELET # BLD AUTO: 168 10E3/UL (ref 150–450)
PLATELET # BLD AUTO: 168 10E3/UL (ref 150–450)
PLATELET # BLD AUTO: 182 10E3/UL (ref 150–450)
PLATELET # BLD AUTO: 186 10E3/UL (ref 150–450)
PO2 BLD: 101 MM HG (ref 80–105)
PO2 BLD: 125 MM HG (ref 80–105)
PO2 BLD: 128 MM HG (ref 80–105)
PO2 BLD: 130 MM HG (ref 80–105)
PO2 BLD: 143 MM HG (ref 80–105)
PO2 BLD: 146 MM HG (ref 80–105)
PO2 BLD: 147 MM HG (ref 80–105)
PO2 BLD: 166 MM HG (ref 80–105)
PO2 BLD: 176 MM HG (ref 80–105)
PO2 BLD: 67 MM HG (ref 80–105)
PO2 BLD: 69 MM HG (ref 80–105)
PO2 BLD: 82 MM HG (ref 80–105)
PO2 BLD: 94 MM HG (ref 80–105)
PO2 BLD: 97 MM HG (ref 80–105)
PO2 BLD: 99 MM HG (ref 80–105)
PO2 BLDV: 37 MM HG (ref 25–47)
POTASSIUM BLD-SCNC: 3.4 MMOL/L (ref 3.4–5.3)
POTASSIUM BLD-SCNC: 3.4 MMOL/L (ref 3.4–5.3)
POTASSIUM BLD-SCNC: 4.2 MMOL/L (ref 3.4–5.3)
POTASSIUM BLD-SCNC: 4.4 MMOL/L (ref 3.4–5.3)
POTASSIUM BLD-SCNC: 4.4 MMOL/L (ref 3.4–5.3)
POTASSIUM BLD-SCNC: 4.7 MMOL/L (ref 3.4–5.3)
PR INTERVAL - MUSE: 182 MS
PR INTERVAL - MUSE: 200 MS
PROT SERPL-MCNC: 6.3 G/DL (ref 6.8–8.8)
PROT SERPL-MCNC: 6.6 G/DL (ref 6.8–8.8)
PROT SERPL-MCNC: 6.6 G/DL (ref 6.8–8.8)
PROT SERPL-MCNC: 6.7 G/DL (ref 6.8–8.8)
PROT SERPL-MCNC: 6.8 G/DL (ref 6.8–8.8)
QRS DURATION - MUSE: 104 MS
QRS DURATION - MUSE: 196 MS
QT - MUSE: 372 MS
QT - MUSE: 528 MS
QTC - MUSE: 472 MS
QTC - MUSE: 663 MS
R AXIS - MUSE: -28 DEGREES
R AXIS - MUSE: -5 DEGREES
RBC # BLD AUTO: 3.31 10E6/UL (ref 3.8–5.2)
RBC # BLD AUTO: 3.44 10E6/UL (ref 3.8–5.2)
RBC # BLD AUTO: 3.45 10E6/UL (ref 3.8–5.2)
RBC # BLD AUTO: 3.49 10E6/UL (ref 3.8–5.2)
RBC # BLD AUTO: 3.63 10E6/UL (ref 3.8–5.2)
SODIUM SERPL-SCNC: 140 MMOL/L (ref 133–144)
SODIUM SERPL-SCNC: 143 MMOL/L (ref 133–144)
SODIUM SERPL-SCNC: 144 MMOL/L (ref 133–144)
SODIUM SERPL-SCNC: 148 MMOL/L (ref 133–144)
SODIUM SERPL-SCNC: 151 MMOL/L (ref 133–144)
SPECIMEN EXPIRATION DATE: NORMAL
SYSTOLIC BLOOD PRESSURE - MUSE: NORMAL MMHG
SYSTOLIC BLOOD PRESSURE - MUSE: NORMAL MMHG
T AXIS - MUSE: 182 DEGREES
T AXIS - MUSE: 186 DEGREES
VENTRICULAR RATE- MUSE: 95 BPM
VENTRICULAR RATE- MUSE: 97 BPM
WBC # BLD AUTO: 15.2 10E3/UL (ref 4–11)
WBC # BLD AUTO: 19.6 10E3/UL (ref 4–11)
WBC # BLD AUTO: 22.5 10E3/UL (ref 4–11)
WBC # BLD AUTO: 22.6 10E3/UL (ref 4–11)
WBC # BLD AUTO: 23.4 10E3/UL (ref 4–11)

## 2022-01-01 PROCEDURE — 999N000015 HC STATISTIC ARTERIAL MONITORING DAILY

## 2022-01-01 PROCEDURE — 83605 ASSAY OF LACTIC ACID: CPT | Performed by: STUDENT IN AN ORGANIZED HEALTH CARE EDUCATION/TRAINING PROGRAM

## 2022-01-01 PROCEDURE — 94640 AIRWAY INHALATION TREATMENT: CPT | Mod: 76

## 2022-01-01 PROCEDURE — 83735 ASSAY OF MAGNESIUM: CPT | Performed by: NURSE PRACTITIONER

## 2022-01-01 PROCEDURE — 95711 VEEG 2-12 HR UNMONITORED: CPT

## 2022-01-01 PROCEDURE — 200N000002 HC R&B ICU UMMC

## 2022-01-01 PROCEDURE — 85027 COMPLETE CBC AUTOMATED: CPT | Performed by: STUDENT IN AN ORGANIZED HEALTH CARE EDUCATION/TRAINING PROGRAM

## 2022-01-01 PROCEDURE — 71045 X-RAY EXAM CHEST 1 VIEW: CPT

## 2022-01-01 PROCEDURE — 82040 ASSAY OF SERUM ALBUMIN: CPT | Performed by: STUDENT IN AN ORGANIZED HEALTH CARE EDUCATION/TRAINING PROGRAM

## 2022-01-01 PROCEDURE — 250N000011 HC RX IP 250 OP 636: Performed by: INTERNAL MEDICINE

## 2022-01-01 PROCEDURE — 82803 BLOOD GASES ANY COMBINATION: CPT | Performed by: NURSE PRACTITIONER

## 2022-01-01 PROCEDURE — 86901 BLOOD TYPING SEROLOGIC RH(D): CPT | Performed by: INTERNAL MEDICINE

## 2022-01-01 PROCEDURE — 250N000013 HC RX MED GY IP 250 OP 250 PS 637: Performed by: NURSE PRACTITIONER

## 2022-01-01 PROCEDURE — 71045 X-RAY EXAM CHEST 1 VIEW: CPT | Mod: 26 | Performed by: RADIOLOGY

## 2022-01-01 PROCEDURE — 94640 AIRWAY INHALATION TREATMENT: CPT

## 2022-01-01 PROCEDURE — 95714 VEEG EA 12-26 HR UNMNTR: CPT

## 2022-01-01 PROCEDURE — 80053 COMPREHEN METABOLIC PANEL: CPT | Performed by: STUDENT IN AN ORGANIZED HEALTH CARE EDUCATION/TRAINING PROGRAM

## 2022-01-01 PROCEDURE — 999N000197 HC STATISTIC WOC PT EDUCATION, 0-15 MIN

## 2022-01-01 PROCEDURE — 999N000157 HC STATISTIC RCP TIME EA 10 MIN

## 2022-01-01 PROCEDURE — 250N000011 HC RX IP 250 OP 636: Performed by: STUDENT IN AN ORGANIZED HEALTH CARE EDUCATION/TRAINING PROGRAM

## 2022-01-01 PROCEDURE — 999N000065 XR CHEST PORT 1 VIEW

## 2022-01-01 PROCEDURE — 250N000009 HC RX 250: Performed by: NURSE PRACTITIONER

## 2022-01-01 PROCEDURE — 99233 SBSQ HOSP IP/OBS HIGH 50: CPT | Mod: 25 | Performed by: PSYCHIATRY & NEUROLOGY

## 2022-01-01 PROCEDURE — 82803 BLOOD GASES ANY COMBINATION: CPT | Performed by: STUDENT IN AN ORGANIZED HEALTH CARE EDUCATION/TRAINING PROGRAM

## 2022-01-01 PROCEDURE — 250N000009 HC RX 250: Performed by: STUDENT IN AN ORGANIZED HEALTH CARE EDUCATION/TRAINING PROGRAM

## 2022-01-01 PROCEDURE — 94003 VENT MGMT INPAT SUBQ DAY: CPT

## 2022-01-01 PROCEDURE — 250N000013 HC RX MED GY IP 250 OP 250 PS 637: Performed by: STUDENT IN AN ORGANIZED HEALTH CARE EDUCATION/TRAINING PROGRAM

## 2022-01-01 PROCEDURE — 99291 CRITICAL CARE FIRST HOUR: CPT | Performed by: INTERNAL MEDICINE

## 2022-01-01 PROCEDURE — 84155 ASSAY OF PROTEIN SERUM: CPT | Performed by: STUDENT IN AN ORGANIZED HEALTH CARE EDUCATION/TRAINING PROGRAM

## 2022-01-01 PROCEDURE — 82805 BLOOD GASES W/O2 SATURATION: CPT | Performed by: NURSE PRACTITIONER

## 2022-01-01 PROCEDURE — 250N000011 HC RX IP 250 OP 636: Performed by: NURSE PRACTITIONER

## 2022-01-01 PROCEDURE — 95718 EEG PHYS/QHP 2-12 HR W/VEEG: CPT | Performed by: PSYCHIATRY & NEUROLOGY

## 2022-01-01 PROCEDURE — 84100 ASSAY OF PHOSPHORUS: CPT | Performed by: NURSE PRACTITIONER

## 2022-01-01 PROCEDURE — 36592 COLLECT BLOOD FROM PICC: CPT | Performed by: STUDENT IN AN ORGANIZED HEALTH CARE EDUCATION/TRAINING PROGRAM

## 2022-01-01 PROCEDURE — 95720 EEG PHY/QHP EA INCR W/VEEG: CPT | Performed by: PSYCHIATRY & NEUROLOGY

## 2022-01-01 PROCEDURE — 250N000013 HC RX MED GY IP 250 OP 250 PS 637: Performed by: INTERNAL MEDICINE

## 2022-01-01 PROCEDURE — 84450 TRANSFERASE (AST) (SGOT): CPT | Performed by: STUDENT IN AN ORGANIZED HEALTH CARE EDUCATION/TRAINING PROGRAM

## 2022-01-01 PROCEDURE — 93010 ELECTROCARDIOGRAM REPORT: CPT | Performed by: INTERNAL MEDICINE

## 2022-01-01 PROCEDURE — C9113 INJ PANTOPRAZOLE SODIUM, VIA: HCPCS | Performed by: STUDENT IN AN ORGANIZED HEALTH CARE EDUCATION/TRAINING PROGRAM

## 2022-01-01 PROCEDURE — 999N000155 HC STATISTIC RAPCV CVP MONITORING

## 2022-01-01 PROCEDURE — 83735 ASSAY OF MAGNESIUM: CPT | Performed by: INTERNAL MEDICINE

## 2022-01-01 PROCEDURE — 999N000147 HC STATISTIC PT IP EVAL DEFER

## 2022-01-01 PROCEDURE — 93005 ELECTROCARDIOGRAM TRACING: CPT

## 2022-01-01 RX ORDER — DEXTROSE MONOHYDRATE 100 MG/ML
INJECTION, SOLUTION INTRAVENOUS CONTINUOUS PRN
Status: DISCONTINUED | OUTPATIENT
Start: 2022-01-01 | End: 2022-01-01 | Stop reason: HOSPADM

## 2022-01-01 RX ORDER — LORAZEPAM 2 MG/ML
1 INJECTION INTRAMUSCULAR ONCE
Status: COMPLETED | OUTPATIENT
Start: 2022-01-01 | End: 2022-01-01

## 2022-01-01 RX ORDER — ACETAMINOPHEN 325 MG/1
650 TABLET ORAL EVERY 6 HOURS PRN
Status: DISCONTINUED | OUTPATIENT
Start: 2022-01-01 | End: 2022-01-01 | Stop reason: HOSPADM

## 2022-01-01 RX ORDER — POTASSIUM CHLORIDE 29.8 MG/ML
20 INJECTION INTRAVENOUS
Status: COMPLETED | OUTPATIENT
Start: 2022-01-01 | End: 2022-01-01

## 2022-01-01 RX ORDER — CHLORHEXIDINE GLUCONATE ORAL RINSE 1.2 MG/ML
15 SOLUTION DENTAL 2 TIMES DAILY
Status: DISCONTINUED | OUTPATIENT
Start: 2022-01-01 | End: 2022-01-01

## 2022-01-01 RX ORDER — NOREPINEPHRINE BITARTRATE 0.06 MG/ML
.01-.6 INJECTION, SOLUTION INTRAVENOUS CONTINUOUS
Status: DISCONTINUED | OUTPATIENT
Start: 2022-01-01 | End: 2022-01-01 | Stop reason: HOSPADM

## 2022-01-01 RX ORDER — FENTANYL CITRATE 50 UG/ML
50 INJECTION, SOLUTION INTRAMUSCULAR; INTRAVENOUS EVERY 10 MIN PRN
Status: DISCONTINUED | OUTPATIENT
Start: 2022-01-01 | End: 2022-01-01 | Stop reason: HOSPADM

## 2022-01-01 RX ORDER — GLYCOPYRROLATE 0.2 MG/ML
0.1 INJECTION, SOLUTION INTRAMUSCULAR; INTRAVENOUS EVERY 4 HOURS PRN
Status: DISCONTINUED | OUTPATIENT
Start: 2022-01-01 | End: 2022-01-01 | Stop reason: HOSPADM

## 2022-01-01 RX ORDER — LORAZEPAM 2 MG/ML
1 INJECTION INTRAMUSCULAR EVERY 10 MIN PRN
Status: DISCONTINUED | OUTPATIENT
Start: 2022-01-01 | End: 2022-01-01 | Stop reason: HOSPADM

## 2022-01-01 RX ORDER — GUAIFENESIN 600 MG/1
15 TABLET, EXTENDED RELEASE ORAL DAILY
Status: DISCONTINUED | OUTPATIENT
Start: 2022-01-01 | End: 2022-01-01

## 2022-01-01 RX ORDER — FENTANYL CITRATE 50 UG/ML
50 INJECTION, SOLUTION INTRAMUSCULAR; INTRAVENOUS EVERY 30 MIN PRN
Status: DISCONTINUED | OUTPATIENT
Start: 2022-01-01 | End: 2022-01-01 | Stop reason: HOSPADM

## 2022-01-01 RX ORDER — ATROPINE SULFATE 10 MG/ML
1 SOLUTION/ DROPS OPHTHALMIC
Status: DISCONTINUED | OUTPATIENT
Start: 2022-01-01 | End: 2022-01-01 | Stop reason: HOSPADM

## 2022-01-01 RX ORDER — IBUPROFEN 200 MG
200 TABLET ORAL EVERY 4 HOURS PRN
COMMUNITY

## 2022-01-01 RX ORDER — ALBUTEROL SULFATE 0.83 MG/ML
2.5 SOLUTION RESPIRATORY (INHALATION) EVERY 6 HOURS PRN
COMMUNITY

## 2022-01-01 RX ORDER — ASPIRIN 81 MG/1
81 TABLET ORAL DAILY
COMMUNITY

## 2022-01-01 RX ORDER — MIDAZOLAM HCL IN 0.9 % NACL/PF 1 MG/ML
1-8 PLASTIC BAG, INJECTION (ML) INTRAVENOUS CONTINUOUS
Status: DISCONTINUED | OUTPATIENT
Start: 2022-01-01 | End: 2022-01-01 | Stop reason: HOSPADM

## 2022-01-01 RX ORDER — ONDANSETRON 2 MG/ML
4 INJECTION INTRAMUSCULAR; INTRAVENOUS EVERY 6 HOURS PRN
Status: DISCONTINUED | OUTPATIENT
Start: 2022-01-01 | End: 2022-01-01 | Stop reason: HOSPADM

## 2022-01-01 RX ORDER — ONDANSETRON 4 MG/1
4 TABLET, ORALLY DISINTEGRATING ORAL EVERY 6 HOURS PRN
Status: DISCONTINUED | OUTPATIENT
Start: 2022-01-01 | End: 2022-01-01 | Stop reason: HOSPADM

## 2022-01-01 RX ADMIN — IPRATROPIUM BROMIDE AND ALBUTEROL SULFATE 3 ML: 2.5; .5 SOLUTION RESPIRATORY (INHALATION) at 08:22

## 2022-01-01 RX ADMIN — POTASSIUM CHLORIDE 20 MEQ: 29.8 INJECTION, SOLUTION INTRAVENOUS at 05:56

## 2022-01-01 RX ADMIN — IPRATROPIUM BROMIDE AND ALBUTEROL SULFATE 3 ML: 2.5; .5 SOLUTION RESPIRATORY (INHALATION) at 11:37

## 2022-01-01 RX ADMIN — IPRATROPIUM BROMIDE AND ALBUTEROL SULFATE 3 ML: 2.5; .5 SOLUTION RESPIRATORY (INHALATION) at 04:45

## 2022-01-01 RX ADMIN — IPRATROPIUM BROMIDE AND ALBUTEROL SULFATE 3 ML: 2.5; .5 SOLUTION RESPIRATORY (INHALATION) at 07:49

## 2022-01-01 RX ADMIN — FENTANYL CITRATE 150 MCG/HR: 50 INJECTION, SOLUTION INTRAMUSCULAR; INTRAVENOUS at 09:16

## 2022-01-01 RX ADMIN — IPRATROPIUM BROMIDE AND ALBUTEROL SULFATE 3 ML: 2.5; .5 SOLUTION RESPIRATORY (INHALATION) at 01:04

## 2022-01-01 RX ADMIN — CEFEPIME HYDROCHLORIDE 2 G: 2 INJECTION, POWDER, FOR SOLUTION INTRAVENOUS at 04:28

## 2022-01-01 RX ADMIN — IPRATROPIUM BROMIDE AND ALBUTEROL SULFATE 3 ML: 2.5; .5 SOLUTION RESPIRATORY (INHALATION) at 01:50

## 2022-01-01 RX ADMIN — Medication 40 MG: at 08:09

## 2022-01-01 RX ADMIN — POLYETHYLENE GLYCOL 3350 17 G: 17 POWDER, FOR SOLUTION ORAL at 08:08

## 2022-01-01 RX ADMIN — ACETAMINOPHEN 650 MG: 325 TABLET, FILM COATED ORAL at 08:08

## 2022-01-01 RX ADMIN — PANTOPRAZOLE SODIUM 40 MG: 40 INJECTION, POWDER, FOR SOLUTION INTRAVENOUS at 08:17

## 2022-01-01 RX ADMIN — NICARDIPINE HYDROCHLORIDE 10 MG/HR: 0.2 INJECTION, SOLUTION INTRAVENOUS at 13:23

## 2022-01-01 RX ADMIN — POTASSIUM CHLORIDE 20 MEQ: 29.8 INJECTION, SOLUTION INTRAVENOUS at 06:47

## 2022-01-01 RX ADMIN — NICARDIPINE HYDROCHLORIDE 2.5 MG/HR: 0.2 INJECTION, SOLUTION INTRAVENOUS at 03:45

## 2022-01-01 RX ADMIN — IPRATROPIUM BROMIDE AND ALBUTEROL SULFATE 3 ML: 2.5; .5 SOLUTION RESPIRATORY (INHALATION) at 22:04

## 2022-01-01 RX ADMIN — MULTIVIT AND MINERALS-FERROUS GLUCONATE 9 MG IRON/15 ML ORAL LIQUID 15 ML: at 08:08

## 2022-01-01 RX ADMIN — Medication 8 MG/HR: at 09:17

## 2022-01-01 RX ADMIN — HEPARIN SODIUM 5000 UNITS: 5000 INJECTION, SOLUTION INTRAVENOUS; SUBCUTANEOUS at 08:09

## 2022-01-01 RX ADMIN — SODIUM BICARBONATE 50 MEQ: 84 INJECTION INTRAVENOUS at 17:14

## 2022-01-01 RX ADMIN — HEPARIN SODIUM 5000 UNITS: 5000 INJECTION, SOLUTION INTRAVENOUS; SUBCUTANEOUS at 00:41

## 2022-01-01 RX ADMIN — HEPARIN SODIUM 5000 UNITS: 5000 INJECTION, SOLUTION INTRAVENOUS; SUBCUTANEOUS at 16:11

## 2022-01-01 RX ADMIN — IPRATROPIUM BROMIDE AND ALBUTEROL SULFATE 3 ML: 2.5; .5 SOLUTION RESPIRATORY (INHALATION) at 15:17

## 2022-01-01 RX ADMIN — POLYETHYLENE GLYCOL 3350 17 G: 17 POWDER, FOR SOLUTION ORAL at 08:18

## 2022-01-01 RX ADMIN — HEPARIN SODIUM 5000 UNITS: 5000 INJECTION, SOLUTION INTRAVENOUS; SUBCUTANEOUS at 00:49

## 2022-01-01 RX ADMIN — IPRATROPIUM BROMIDE AND ALBUTEROL SULFATE 3 ML: 2.5; .5 SOLUTION RESPIRATORY (INHALATION) at 03:21

## 2022-01-01 RX ADMIN — ACETAMINOPHEN 650 MG: 325 TABLET, FILM COATED ORAL at 15:53

## 2022-01-01 RX ADMIN — CEFEPIME HYDROCHLORIDE 2 G: 2 INJECTION, POWDER, FOR SOLUTION INTRAVENOUS at 04:06

## 2022-01-01 RX ADMIN — IPRATROPIUM BROMIDE AND ALBUTEROL SULFATE 3 ML: 2.5; .5 SOLUTION RESPIRATORY (INHALATION) at 11:25

## 2022-01-01 RX ADMIN — IPRATROPIUM BROMIDE AND ALBUTEROL SULFATE 3 ML: 2.5; .5 SOLUTION RESPIRATORY (INHALATION) at 07:59

## 2022-01-01 RX ADMIN — LORAZEPAM 1 MG: 2 INJECTION INTRAMUSCULAR; INTRAVENOUS at 16:23

## 2022-01-01 RX ADMIN — NICARDIPINE HYDROCHLORIDE 10 MG/HR: 0.2 INJECTION, SOLUTION INTRAVENOUS at 17:45

## 2022-01-01 RX ADMIN — CEFEPIME HYDROCHLORIDE 2 G: 2 INJECTION, POWDER, FOR SOLUTION INTRAVENOUS at 03:43

## 2022-01-01 RX ADMIN — CHLORHEXIDINE GLUCONATE 15 ML: 1.2 SOLUTION ORAL at 19:47

## 2022-01-01 RX ADMIN — Medication 0.04 MCG/KG/MIN: at 17:14

## 2022-01-01 RX ADMIN — POTASSIUM CHLORIDE 20 MEQ: 29.8 INJECTION, SOLUTION INTRAVENOUS at 19:06

## 2022-01-01 RX ADMIN — HEPARIN SODIUM 5000 UNITS: 5000 INJECTION, SOLUTION INTRAVENOUS; SUBCUTANEOUS at 08:16

## 2022-01-01 RX ADMIN — SENNOSIDES AND DOCUSATE SODIUM 1 TABLET: 8.6; 5 TABLET ORAL at 22:08

## 2022-01-01 RX ADMIN — HEPARIN SODIUM 5000 UNITS: 5000 INJECTION, SOLUTION INTRAVENOUS; SUBCUTANEOUS at 00:52

## 2022-01-01 RX ADMIN — MULTIVIT AND MINERALS-FERROUS GLUCONATE 9 MG IRON/15 ML ORAL LIQUID 15 ML: at 10:27

## 2022-01-01 RX ADMIN — NICARDIPINE HYDROCHLORIDE 10 MG/HR: 0.2 INJECTION, SOLUTION INTRAVENOUS at 10:23

## 2022-01-01 RX ADMIN — SENNOSIDES AND DOCUSATE SODIUM 1 TABLET: 8.6; 5 TABLET ORAL at 00:49

## 2022-01-01 RX ADMIN — IPRATROPIUM BROMIDE AND ALBUTEROL SULFATE 3 ML: 2.5; .5 SOLUTION RESPIRATORY (INHALATION) at 15:27

## 2022-01-01 RX ADMIN — DOCUSATE SODIUM 50 MG AND SENNOSIDES 8.6 MG 2 TABLET: 8.6; 5 TABLET, FILM COATED ORAL at 08:08

## 2022-01-01 RX ADMIN — POTASSIUM CHLORIDE 20 MEQ: 29.8 INJECTION, SOLUTION INTRAVENOUS at 18:03

## 2022-01-01 RX ADMIN — HEPARIN SODIUM 5000 UNITS: 5000 INJECTION, SOLUTION INTRAVENOUS; SUBCUTANEOUS at 15:53

## 2022-01-01 RX ADMIN — IPRATROPIUM BROMIDE AND ALBUTEROL SULFATE 3 ML: 2.5; .5 SOLUTION RESPIRATORY (INHALATION) at 20:27

## 2022-01-01 RX ADMIN — LORAZEPAM 1 MG: 2 INJECTION INTRAMUSCULAR; INTRAVENOUS at 16:19

## 2022-01-01 ASSESSMENT — ACTIVITIES OF DAILY LIVING (ADL)
ADLS_ACUITY_SCORE: 13
ADLS_ACUITY_SCORE: 9
ADLS_ACUITY_SCORE: 13
ADLS_ACUITY_SCORE: 11
ADLS_ACUITY_SCORE: 13
ADLS_ACUITY_SCORE: 11
ADLS_ACUITY_SCORE: 13
ADLS_ACUITY_SCORE: 11
ADLS_ACUITY_SCORE: 13
ADLS_ACUITY_SCORE: 9
ADLS_ACUITY_SCORE: 11
ADLS_ACUITY_SCORE: 13
ADLS_ACUITY_SCORE: 9
ADLS_ACUITY_SCORE: 13
ADLS_ACUITY_SCORE: 9
ADLS_ACUITY_SCORE: 9
ADLS_ACUITY_SCORE: 13
ADLS_ACUITY_SCORE: 11
ADLS_ACUITY_SCORE: 13
ADLS_ACUITY_SCORE: 9
ADLS_ACUITY_SCORE: 9
ADLS_ACUITY_SCORE: 13
ADLS_ACUITY_SCORE: 11
ADLS_ACUITY_SCORE: 13
ADLS_ACUITY_SCORE: 11
ADLS_ACUITY_SCORE: 13
ADLS_ACUITY_SCORE: 11
ADLS_ACUITY_SCORE: 11
ADLS_ACUITY_SCORE: 13
ADLS_ACUITY_SCORE: 11
ADLS_ACUITY_SCORE: 9
ADLS_ACUITY_SCORE: 11
ADLS_ACUITY_SCORE: 13
ADLS_ACUITY_SCORE: 9
ADLS_ACUITY_SCORE: 13
ADLS_ACUITY_SCORE: 9
ADLS_ACUITY_SCORE: 13

## 2022-01-01 ASSESSMENT — MIFFLIN-ST. JEOR
SCORE: 1580.31
SCORE: 1595.31
SCORE: 1554.31

## 2022-01-01 NOTE — PROGRESS NOTES
CLINICAL NUTRITION SERVICES - Brief  NOTE      Nutrition Prescription     RECOMMENDATIONS FOR MDs/PROVIDERS TO ORDER:  1. Glycemic control  2. Refeeding precautions    Recommendations already ordered by Registered Dietitian (RD):  1. Enteral Nutrition support recommendation Ordered per ICU dietitian recommendation on :   --Access: OG tube   --Dosing wt: 78 kg adjusted      --Once rewarmed, begin TF with Osmolite 1.5 @ 15 mL/hr and adv by 10 mL q8h until @ goal rate of 65 ml/hr, (Do not start or advance until lytes (Mg++,K+) WNL and phos>1.9)     --Regimen to provide: 2340 kcals (30 kcal/kg), 97 gm PRO (1.2 g/kg), 1188 mL H2O, 317 g CHO, and 0 g fiber daily. This will meet estimated kcal and protein goals.    - Free water flushes: 30 ml Q4 hrs for tube patency.  - Multivitamin/mineral: Certavite 15 ml/day via FT.   --K+, Mg, Phos monitoring until TF advances to goal rate for evaluation of refeeding syndrome and need for Lyte replacement.  - HOB elevation >30 degrees with all gastric feeds         Provider consult received: Registered Dietitian to Assess and Order TF per Medical Nutrition Therapy      Nutrition Progress Note - See ICU RD assessment note from 21     FEN / GI:   Nutrition:   --NPO, intubated, sedated, hypothermia ( cooled to 34 degree and warmed since 12 am), PEA cardiac arrest.   --FT: Has an OGT access ( placed 21)     IVF:   --Per ICU protocol      Lytes as of :   --K+: 4.4, Mg++:2.7 (H), Phos:3.7  --B (H), no history of diabetes noted, started on insulin   --Na+: 140  --BUN: 38 (H), Cr: 1.01     GI:   No BM recorded since admit   Meds reviewed: Started on bowel regimen     Foreugenio Granados RD/LD  Weekend Pager [phone]

## 2022-01-01 NOTE — PHARMACY-ADMISSION MEDICATION HISTORY
Admission Medication History Completed by Pharmacy    See [facility] Admission Navigator for allergy information, preferred outpatient pharmacy, prior to admission medications and immunization status.     Medication History Sources:     Surescripts    Changes made to PTA medication list (reason):    Added: albuterol nebs    Deleted: lovastatin 20 mg daily, oxycodone/APAP 5-325 mg 1 to 2 tabs q4h prn    Changed: None    Additional Information:    Consider interview when patient's clinical status improves.    Prior to Admission medications    Medication Sig Last Dose Taking? Auth Provider   albuterol (PROVENTIL) (2.5 MG/3ML) 0.083% neb solution Take 2.5 mg by nebulization every 6 hours as needed for shortness of breath / dyspnea or wheezing  Yes Unknown, Entered By History   aspirin 81 MG EC tablet Take 81 mg by mouth daily  Yes Unknown, Entered By History   ibuprofen (ADVIL/MOTRIN) 200 MG tablet Take 200 mg by mouth every 4 hours as needed for mild pain  Yes Unknown, Entered By History   atenolol (TENORMIN) 50 MG tablet Take 50 mg by mouth daily   Reported, Patient   calcium 500-125 MG-UNIT TABS Take 1 tablet by mouth 2 times daily    Reported, Patient   gabapentin (NEURONTIN) 300 MG capsule TAKE 1 CAPSULE BY MOUTH THREE TIMES DAILY   Reported, Patient   LORazepam (ATIVAN) 0.5 MG tablet Take 0.5 mg by mouth daily as needed    Reported, Patient   Multiple Vitamin (MULTIVITAMIN OR) Take 1 tablet by mouth twice a week.   Reported, Patient   nortriptyline (PAMELOR) 25 MG capsule Take 50 mg by mouth At Bedtime   Reported, Patient   traZODone (DESYREL) 50 MG tablet Take 1 tablet by mouth at bedtime if needed for Sleep.   Reported, Patient   umeclidinium-vilanterol (ANORO ELLIPTA) 62.5-25 MCG/INH oral inhaler Inhale 1 puff into the lungs daily    Reported, Patient   VITAMIN C 500 MG OR TABS 1 TABLET TWICE DAILY   Reported, Patient       Date completed: 01/01/22    Medication history completed by: Sia Navarrete RPH

## 2022-01-01 NOTE — PHARMACY
Pharmacy Tube Feeding Consult    Medication reviewed for administration by feeding tube and for potential food/drug interactions.    Recommendation: No changes are needed at this time.     Pharmacy will continue to follow as new medications are ordered.    Adryan OconnorD  CVICU and Advanced Heart Failure Pharmacist  Pager [phone]

## 2022-01-01 NOTE — PROGRESS NOTES
Major Shift Events:   Pt remains unresponsive with occasional myoclonic jerking. Occasional blinking and remains with an upward gaze. Thermaguard remains on to maintain normothermic level. Pt remains off sedation. Map >65 and SBP <130. On/Off Nicardipine. SR with rare tachy episodes. TVP rarely pacing and set to VVI on rate 60. FIO2 increased to 60% due to PO2. Hypoactive Bowel sounds. OGT to LIS. Flores in place. CVP 14. Monitor Lactic acid. Skin remains pale and mottled with slow cap refills.       Plan:   Continue plan of care.       For vital signs and complete assessments, please see documentation flowsheets.

## 2022-01-01 NOTE — PROGRESS NOTES
Cardiology ICU Progress Note    Brief HPI: Barbara Nelson is a 76 year old female with PMHx of GOLD stage II COPD, hx of non-small cell lung cancer s/p right lobectomy in 2006, stable lung nodule, hypertension, anxiety & peripheral neuropathy who presents as a transfer from the [facility] ED to the [facility] CICU on 12/30/2021 s/p cardiac arrest.     The patient was reportedly watching TV when she complained of sudden onset shortness of breath. She started a nebulizer treatment- however half way through collapsed (witnessed by ). He administered breaths (but no compressions). EMS arrived on the scene approximately 15 minutes s/p arrest and found the patient to be pulseless and agonal breathing. Initial rhythm was non-shockable. CPR started, 3 rounds of epinephrine given and ROSC achieved after approximately 10-12 mins of ACLS. Patient initiated on an epinephrine drip, given 50 mEq bicarb and intubated on the field. On arrival to the [facility] ED, she was noted to be bradycardic and patinet was then transferred to [facility] for further care. Upon arrival to [facility] patient remained bradycardic for which EP consulted and temp TVP placed for CHB. A thermogard cooling catheter was placed and cooling was initiated to 34C. Warm since 12/31.     Subjective and Interval: Remains intubated. Overnight, mild hypoxia with pa02s in the 50's, increased her fio2 on vent 40-->60%, repeat pao2 60's. Yesterday, started bowel regimen, resumed subcutaneous Heparin, stopped sedation, changed our MAP goal for Nicardipine to keep SBP < 130,   Diurese slightly with goal net even to net -500 ml  (net neg 650 yest, net neg 300 so far today)  EKG to assess for aflutter (appears to be SR)    Assessment and plan by system:   Today's changes:  - Nutrition consult for TF initiation- may need to wait until tomorrow given mild illeus and abdominal distention  - Social work consult per family request  - Trial different vent mode given possible air  trapping/abdominal breathing- PC or SIMV?  - PICC, pull thermogard coming days. Keep for now to keep pt normothermic  - Increase  to assess if helps pt tachypnea     Neurology   # Concern for hypoxic brain injury  # Seizure-like activity   Intubated and sedated. Cooled to 34 degrees. Warm since 12/31 am. Did have apparent seizure like activity on arrival which improved with benzodiazepines. No seizures thus far on EEG , consistent now with burst suppression where the suppressions are becoming shorter with longer bursts. Consider MRI per NCC     Head CT with questionable small hypodense foci scattered throughout the  bilateral basal ganglia, thalami and right cerebellum concerning for acute infarctions   - NCC consult; appreciate recommendations    Sedation:  Off currently    Cardiovascular  # PEA cardiac arrest   # HTN  # 3rd degree AV block s/p temp TVP via RIJ  CHB on arrival, EP consulted and temp TVP placed. Echo with normal biventricular function, EF of 55-60%, abnormal septal motion, (has pacer) normal RV, no effusion    Vasopressors/antiarrhythmics/inotropes:  - Intermittently requiring Nicardipine to Keep SBP < 130, currently on @ 10  - Add PO Hydralazine/Norvasc today?  - Intermittently tachycardic, intermittently pacing. EKG with sinus rhythm/PVC's     Pulmonary  # Acute hypoxic respiratory failure  # Pulmonary edema   # Pleural effusions  # Possible aspiration pneumonia  Vent settings: CMV 20/500/8/50. ABG 7.4/38/67/24  Trial alternate vent mode for possible air trapping today  - Broad-spectrum antibiotics and sputum cultures (see ID plan).  - Wean vent as able.  - Daily CXR. Todays w elevated r hemidiaphragm and effusion, stable from yest, ETT slightly high, could advance 1.5 cm  - Intermittent diuresis     Gastrointestinal, Nutrition  # Mild ileus  LFTS stable, AST bumped from 48--> 68 now that warm  - CT Chest, abdomen, pelvis with prominent appendix, GGO, infection  - Nutrition consult today-  "may need to hold of nutrition initiation given abdominal distention and mild ileus  - Bowel regimen in place  - GI prophylaxis: Protonix 40 mg IV daily.     Renal, Electrolytes  # KAYLYN  # Lactic acidosis  # Hypoalbuminemia  Lactic improving 1.9 (3.4 after rewarming)  Creatinine normal 1.01 (1.01)  700 ml UOP yesterday, 491 since midnight, net even goal today  - Intermittent diuresis  - Monitor urine output/Flores for strict I/O's      Infectious Disease  # Aspiration pneumonia  # Leukocytosis  WBC up to 22.5 (18, 26.4  Day prior) Tmax 99.5  - Cefepime x5 days for aspiration pneumonia.   - stopped vancomycin 12/31 as MRSA is negative  - Cultures pending, no growth thus far     Hematology  # Anemia  HGB 10.7 (12)  (163)   - Transfuse for HGB <8  - DVT prophylaxis: heparin subcutaneous      Endocrinology  # Hyperglycemia  No known medical history. BG elevated in setting of critical illness. HbA1c: 5.6%   Mo insulin required as of now     MSK/Skin  No active issues.     Pertinent Lines  L femoral Thermogard December 30, 2021  L radial arterial line December 30, 2021  ETT December 29, 2021  Flores catheter December 30, 2021  OG tube December 30, 2021    Patient seen and discussed with staff physician.    CRISTELA Mann, CNP  [facility] [place]  Interventional Cardiology-I Service  Pager [phone]     Objective:  Most recent vital signs:  /54 (BP Location: Left arm)   Pulse 94   Temp 99.3  F (37.4  C)   Resp (!) 35   Ht 1.765 m (5' 9.5\")   Wt 104.3 kg (229 lb 15 oz)   SpO2 100%   BMI 33.47 kg/m    Temp:  [96.3  F (35.7  C)-99.5  F (37.5  C)] 99.3  F (37.4  C)  Pulse:  [] 94  Resp:  [20-35] 35  BP: (111)/(54) 111/54  MAP:  [60 mmHg-97 mmHg] 68 mmHg  Arterial Line BP: (118-165)/(32-57) 126/38  FiO2 (%):  [40 %-60 %] 50 %  SpO2:  [87 %-100 %] 100 %  Wt Readings from Last 2 Encounters:   01/01/22 104.3 kg (229 lb 15 oz)   12/30/21 101.1 kg (222 lb 14.2 oz) "       Intake/Output Summary (Last 24 hours) at 1/1/2022 0823  Last data filed at 1/1/2022 0700  Gross per 24 hour   Intake 1539.58 ml   Output 2582 ml   Net -1042.42 ml       Physical exam:  General: In bed, in NAD  HEENT: PERRL, no scleral icterus or injection  CARDIAC: RRR, no m/r/g appreciated. Peripheral pulses dopplered  RESP: Mechanical ventilation; CTAB, no wheezes, rhonchi or crackles appreciated.  GI: soft, BS hypoactive  : Flores  EXTREMITIES: Trace LE edema, pulses 2+. Femoral access site w/o bleeding, dressing c/d/i.   SKIN: No acute lesions appreciated  NEURO: Intubated, not responding    Labs (Past three days):  CBC  Recent Labs   Lab 01/01/22  0348 12/31/21  1602 12/31/21  0352 12/30/21  1416   WBC 22.5* 20.9* 18.5* 21.1*   RBC 3.63* 3.98 4.06 4.42   HGB 10.7* 11.7 12.0 13.2   HCT 34.0* 36.1 37.5 40.9   MCV 94 91 92 93   MCH 29.5 29.4 29.6 29.9   MCHC 31.5 32.4 32.0 32.3   RDW 14.0 13.7 13.5 13.4    191 163 202     BMP  Recent Labs   Lab 01/01/22  0359 01/01/22  0349 01/01/22  0018 12/31/21  2355 12/31/21  2022 12/31/21  1602 12/31/21  0750 12/31/21  0352 12/30/21  1553 12/30/21  1416 12/30/21  0426 12/30/21  0424   NA  --  140  --   --   --  142  --  143  --  144  --  144   POTASSIUM  --  4.4  --  4.4  --  3.2* 4.0 4.2  --  3.8  --  3.9   CHLORIDE  --  108  --   --   --  108  --  107  --  112*  --  112*   CO2  --  22  --   --   --  22  --  27  --  24  --  23   ANIONGAP  --  10  --   --   --  12  --  9  --  8  --  9   * 126* 106*  --  167* 206*  --  116*   < > 116*   < > 167*   BUN  --  38*  --   --   --  36*  --  36*  --  31*  --  24   CR  --  1.01  --   --   --  1.05*  --  1.06*  --  1.18*  --  1.39*   GFRESTIMATED  --  57*  --   --   --  54*  --  54*  --  47*  --  39*   MIKE  --  8.5  --   --   --  8.2*  --  8.4*  --  8.4*  --  8.7   MAG  --  2.7*  --   --   --  1.7 1.8  --   --   --   --  1.9   PHOS  --  3.7  --   --   --  2.7  --   --   --   --   --   --     < > = values in this  interval not displayed.     Troponins:     INR  Recent Labs   Lab 12/30/21  1416 12/30/21  0424 12/29/21  2219   INR 1.27* 1.21* 1.21*     Liver panel  Recent Labs   Lab 01/01/22  0349 12/31/21  1602 12/31/21  0352 12/30/21  1416   PROTTOTAL 6.3* 6.6* 6.2* 6.5*   ALBUMIN 2.5* 2.7* 2.7* 2.7*   BILITOTAL 1.2 1.2 1.2 1.1   ALKPHOS 85 86 83 85   AST 68* 69* 48* 47*   ALT 27 31 31 32       Imaging/procedure results:  XR Chest Port 1 View  Narrative: EXAM: XR Chest 1 view 1/1/2022 4:10 AM      HISTORY: ETT, line placement.    COMPARISON: Previous day.     TECHNIQUE: Frontal view of the chest.    FINDINGS: ET tube is 5.0 cm from the al. Esophageal temperature  probe with tip in the midesophagus. Enteric tube is subdiaphragmatic  with tip collimation of the study. Right IJ temporary endocardial  pacing lead in the right ventricle.  Trachea is midline. Cardiomediastinal silhouette is stable. Low lung  volumes. Stable bilateral interstitial opacities. Small right pleural  effusion. No left pleural effusion. No pneumothoraces. No acute  osseous abnormalities.  Impression: IMPRESSION:   1. ET tube is 5.0 cm from the al.  2. Right IJ temporary endocardial pacing lead with lead in the right  ventricle.  3. Low lung volumes with stable bilateral interstitial opacities.  Small right pleural effusion.    I have personally reviewed the examination and initial interpretation  and I agree with the findings.    GABRIEL CHAN MD         SYSTEM ID:  [id number]

## 2022-01-01 NOTE — PROGRESS NOTES
D/A/I/P:Patient continues to breath rapidly with RR 38-42 with some vent changes, seem ABG results. Neuro: No response to commands, no movement of extremities\even with pain stimuli,PEARLLA, patient is in a almost constant state of muscle twitches, U/O with adequate output,50cc of fluis from Nicardipine drip to keep SBP< 130, Continuous EEG, see neurology note,keeping temp down to normothermic with Thermagard cooling system.Skin intact except for abrasion on chest most likely from [place] during ACLS.Possibly going to start feeding tomorrow, has small ileus at this time per  XRAY.has backup pacer with no pacing this shift so far.BS within normal parameters.CVP increasing from 7-12 to 16, CSI team aware.Monitor all parameters and continue plan of care. Son at bedside for a few hours and asking appropriate questions.   Turning and changing linenat approx 1740 and patients b/p dropped immediately turned off Nicardipine [id number] as b/p did not come back to base.Dr. Torres in house and aware of all parameters, monitor closely

## 2022-01-01 NOTE — PROGRESS NOTES
Neuroscience Intensive Care Progress Note    REASON FOR CRITICAL CARE ADMISSION:  Cardiac Arrest     Date of Service:  2022  Date of Admission:  2021  Hospital Day: 3    24 hour events:  Previously seen myoclonic movements have dissipated and bursts seen on EEG have flattened out. Exam remains very poor despite re-warming and being off all sedation since yesterday morning. Pt also being treated with Cefepime for possible aspiration pneumonia    24 Hour Vital Signs Summary:  Temp: 99.1  F (37.3  C) Temp  Min: 98.6  F (37  C)  Max: 99.5  F (37.5  C)  Resp: (!) 42 Resp  Min: 20  Max: 42  SpO2: 99 % SpO2  Min: 85 %  Max: 100 %  Pulse: 92 Pulse  Min: 60  Max: 117    No data recorded  BP: 111/54 Systolic (24hrs), Av , Min:111 , Max:111   Diastolic (24hrs), Av, Min:54, Max:54        Respiratory monitoring:   Ventilation Mode: CMV/AC  (Continuous Mandatory Ventilation/ Assist Control)  FiO2 (%): 50 %  Rate Set (breaths/minute): 20 breaths/min  Tidal Volume Set (mL): 500 mL  PEEP (cm H2O): 8 cmH2O  Oxygen Concentration (%): 50 %  Peak Inspiratory Pressure (cm H2O) (Drager Bette): 29  Resp: (!) 42      I/O last 3 completed shifts:  In: 1637.9 [I.V.:1502.9; NG/GT:135]  Out: 2607 [Urine:2207; Emesis/NG output:400]    Current Medications:    ceFEPIme (MAXIPIME) IV  2 g Intravenous Q24H     heparin ANTICOAGULANT  5,000 Units Subcutaneous Q8H     ipratropium - albuterol 0.5 mg/2.5 mg/3 mL  3 mL Nebulization Q4H     multivitamins w/minerals  15 mL Per Feeding Tube Daily     [START ON 2022] pantoprazole  40 mg Oral or Feeding Tube Daily     polyethylene glycol  17 g Oral Daily     senna-docusate  1 tablet Oral At Bedtime       PRN Medications:  acetaminophen **OR** acetaminophen, dextrose, glucose **OR** dextrose **OR** glucagon, fentaNYL, naloxone **OR** naloxone **OR** naloxone **OR** naloxone, polyethylene glycol, senna-docusate **OR** senna-docusate    Infusions:    dextrose       niCARdipine 10 mg/hr  (01/01/22 1323)       Allergies   Allergen Reactions     Lisinopril Other (See Comments)     Comment: cough, Description:      Losartan Other (See Comments)     Comment: dizziness, Description:      Latex Other (See Comments) and Rash     Comment: Itching, Description:          Physical Examination:  General: Adult female patient, lying in bed, NAD  HEENT: Normocephalic/Atraumatic, no icterus  Cardiac: RRR  Chest: Intubated on mechanical ventilation  Abdomen: Tense  Extremities: Warm, no edema  Skin: No rash or lesion     Neuro:  Mental status: Pt does not wake to either verbal, light touch, or noxious stimuli. Does not open eyes spontaneously or to command. Does not track or follow any command  Cranial nerves: PERRL   Motor: Normal bulk and tone. Fails to respond to noxious stimuli in any extremity. Previously noted myoclonic movements no longer present  Sensory: Fails to respond to noxious stimuli in any extremity  Coordination: Deferred  Gait: Deferred    Labs/Studies:  BMP  Recent Labs   Lab 01/01/22  0349 12/31/21  2355 12/31/21  1602 12/31/21  0750 12/31/21  0352 12/30/21  1416     --  142  --  143 144   POTASSIUM 4.4 4.4 3.2* 4.0 4.2 3.8   CHLORIDE 108  --  108  --  107 112*   CO2 22  --  22  --  27 24   BUN 38*  --  36*  --  36* 31*   CR 1.01  --  1.05*  --  1.06* 1.18*   MIKE 8.5  --  8.2*  --  8.4* 8.4*       CBC  Recent Labs   Lab 01/01/22  0348 12/31/21  1602 12/31/21  0352 12/30/21  1416   WBC 22.5* 20.9* 18.5* 21.1*   HGB 10.7* 11.7 12.0 13.2    191 163 202       ABG  Recent Labs   Lab 01/01/22  1230 01/01/22  0903 01/01/22  0350 12/31/21  2353   PH 7.34* 7.38 7.40 7.40   PCO2 36 35 38 39   PO2 82 99 67* 69*   HCO3 19* 20* 24 24         Imaging:  [facility] (12/30/2021)  Impression:   1. Questionable small hypodense foci scattered throughout the  bilateral basal ganglia, thalami and right cerebellum are new from  1/30/2018 and may represent acute infarctions. Mildly indistinct  cerebral gray-white  matter differentiation diffusely is concerning for  hypoxic ischemic insult. Consider MRI to further evaluate.  2. No acute intracranial hemorrhage    CXR (1/1/2022)  IMPRESSION:   1. ET tube is 5.0 cm from the al.  2. Right IJ temporary endocardial pacing lead with lead in the right  ventricle.  3. Low lung volumes with stable bilateral interstitial opacities.  Small right pleural effusion.    All relevant imaging and laboratory values personally reviewed    Assessment/Plan  Barbara Nelson is a 76 year old female who presented on 12/30/2021 with cardiac arrest. The NCC service was consulted to evaluate for prognostication. Pt's initial burst-suppression pattern seen on EEG has today worsened and become more isoelectric with loss of previously noted bursts. This is a more concerning pattern to the already worrisome one seen in prior days. As such, suspicion remains very high that the degree of the pt's neurologic injury is quite severe. Will continue to monitor as sedating medications are held. MRI Brain remains a possibility to fully reveal the extent of hypoxic injury, but cannot be completed while pacer wire remains in place.     Recommendations:  - vEEG  - No need to start anti-seizure medication at this time  - Would prefer to perform an MRI Brain for further clarification of ischemic damage, but cannot be performed while pt's pacer is in place  - Would encourage a goals of care discussion between the pt's family and primary team  - Avoid hypotonic solutions as they worsen cerebral edema  - Avoid nitroprusside/nitroglycerin as able - can increase ICP's   - When safe to do so, limitation of CNS acting medications will permit a more accurate neurological assessment  - Continue to wean sedation as able  - Will follow neurologic examination once rewarmed and weaned of sedation  - The neurocritical care service will continue to follow    The patient was seen and discussed with the NCC attending,   James.    Charlette Doyle MD  Neurology PGY2  [id number]  01/01/2022

## 2022-01-02 NOTE — PLAN OF CARE
Major Shift Events:  Pt transitioned to DNR/DNI. Neuro status remains unchanged; family aware, would like to proceed with comfort cares. Hypotensive this evening, levo started to stabilize patient so family can be here tomorrow when extubated. Tmax 101.8, tylenolx2.   Plan: Extubate and comfort cares tomorrow    For vital signs and complete assessments, please see documentation flowsheets.

## 2022-01-02 NOTE — PROGRESS NOTES
Major Shift Events: Sinus rhythm. CMV 40%. tachypneic in the upper 30s. Good urine output. Patient continues to be unresponsive. No response to painful stimuli. No cough with stimulation. Intermittent muscle twitches. thermogard in place to keep patient normothermic. CVP 9-10. BP stable (MAP >65 and SBP <130) without the use of nicardipine.     Plan: MRI when safe to take out temp pacer. Start tube feeding.    For vital signs and complete assessments, please see documentation flowsheets.

## 2022-01-02 NOTE — PROGRESS NOTES
Cardiology ICU Progress Note    Brief HPI: Barbara Nelson is a 76 year old female with PMHx of GOLD stage II COPD, hx of non-small cell lung cancer s/p right lobectomy in 2006, stable lung nodule, hypertension, anxiety & peripheral neuropathy who presents as a transfer from the [facility] ED to the [facility] CICU on 12/30/2021 s/p cardiac arrest.     The patient was reportedly watching TV when she complained of sudden onset shortness of breath. She started a nebulizer treatment- however half way through collapsed (witnessed by ). He administered breaths (but no compressions). EMS arrived on the scene approximately 15 minutes s/p arrest and found the patient to be pulseless and agonal breathing. Initial rhythm was non-shockable. CPR started, 3 rounds of epinephrine given and ROSC achieved after approximately 10-12 mins of ACLS. Patient initiated on an epinephrine drip, given 50 mEq bicarb and intubated on the field. On arrival to the [facility] ED, she was noted to be bradycardic and patinet was then transferred to [facility] for further care. Upon arrival to [facility] patient remained bradycardic for which EP consulted and temp TVP placed for CHB. A thermogard cooling catheter was placed and cooling was initiated to 34C. Warm since 12/31.     Subjective and Interval: Remains intubated. Overnight, Nicardipine weaned off. Anion gap Metabolic acidosis overnight, unclear cause, lactic continues to come down 1.2 (1.9)  Yes has a WBC, kidney function fairly stable w slight KAYLYN 1.21, potassium fine, no hx of DM or concern for DKA, uremic but BUN only 59. Na WNL, chloride essentially normal 110, albumin not too bad 2.6. Yesterday, Nutrition consult for TF initiation- may need to wait until tomorrow given mild illeus and abdominal distention. Social work consulted per family request, trialed different vent mode given possible air trapping/abdominal breathing.    Assessment and plan by system:   Today's changes:  Pull pacer for  MRI  Brain MRI limited [facility] tomorrow to pull thermogard  Start Tube feeds    # Addendum: Conference with patient's family regarding goals of care. Patients family  Discussed advance directive of patient expressed no life support whatsoever, nor tube feeds or CPR. Due to this and poor neuro progonosis, plan was made to transition to comfort care. Code status updated.     Neurology   # Concern for hypoxic brain injury  # Seizure-like activity   Intubated and sedated. Cooled to 34 degrees. Warm since 12/31 am. Did have apparent seizure like activity on arrival which improved with benzodiazepines. No seizures thus far on EEG , consistent now with burst suppression where the suppressions are becoming shorter with longer bursts. Consider MRI per NCC    Head CT with questionable small hypodense foci scattered throughout the  bilateral basal ganglia, thalami and right cerebellum concerning for acute infarctions   - [facility] consult; appreciate recommendations    Sedation:  Off currently    Cardiovascular  # PEA cardiac arrest   # HTN  # 3rd degree AV block s/p temp TVP via RIJ  CHB on arrival, EP consulted and temp TVP placed. Echo with normal biventricular function, EF of 55-60%, abnormal septal motion, (has pacer) normal RV, no effusion    Vasopressors/antiarrhythmics/inotropes:  - Intermittently requiring Nicardipine to Keep SBP < 130, currently off  - Intermittently tachycardic, not reliant on pacer. EKG with sinus rhythm/PVC's     Pulmonary  # Acute hypoxic respiratory failure  # Pulmonary edema   # Pleural effusions  # Possible aspiration pneumonia  Vent settings: CMV 16/500/10/40. ABG 7.3/29/147/29  Trial alternate vent mode for possible air trapping today  - Broad-spectrum antibiotics and sputum cultures (see ID plan).  - Wean vent as able.  - Daily CXR. Todays w elevated r hemidiaphragm and effusion, stable from yest, ETT slightly high, could advance 1.5 cm  - Intermittent diuresis     Gastrointestinal,  "Nutrition  # Mild ileus  LFTS AST continues to up-trend. 68-->106. ALT WNL. T Bili slightly bumped, 1.5   - CT Chest, abdomen, pelvis with prominent appendix, GGO, infection  - Nutrition consult today- may need to hold of nutrition initiation given abdominal distention and mild ileus  - Bowel regimen in place  - GI prophylaxis: Protonix 40 mg IV daily.     Renal, Electrolytes  # KAYLYN  # Lactic acidosis  # Hypoalbuminemia  Lactic improving 1.2 (1.9)  Creatinine bumped, normal 1.2 (1.01 )  1.6 LUOP yesterday, 700 since midnight, net even goal today  Net neg 700 ml yesterday, net neg 700 ml today  - Intermittent diuresis  - Monitor urine output/Flores for strict I/O's      Infectious Disease  # Aspiration pneumonia  # Leukocytosis  WBC up to 23 (22.5) Tmax 99.5   - Cefepime x5 days for aspiration pneumonia.   - stopped vancomycin 12/31 as MRSA is negative  - Cultures pending, no growth thus far     Hematology  # Anemia  HGB 10.3 (10.7)  (163)   - Transfuse for HGB <8  - DVT prophylaxis: heparin subcutaneous      Endocrinology  # Hyperglycemia  No known medical history. BG elevated in setting of critical illness. HbA1c: 5.6%   Mo insulin required as of now     MSK/Skin  No active issues.     Pertinent Lines  L femoral Thermogard December 30, 2021  L radial arterial line December 30, 2021  ETT December 29, 2021  Flores catheter December 30, 2021  OG tube December 30, 2021    Patient seen and discussed with staff physician.    Bisi Rubio, APRN, CNP  [facility] [place] [facility]  Interventional Cardiology-CSI Service  Pager [phone]     Objective:  Most recent vital signs:  /57   Pulse 86   Temp 99  F (37.2  C)   Resp (!) 36   Ht 1.765 m (5' 9.5\")   Wt 102.8 kg (226 lb 10.1 oz)   SpO2 100%   BMI 32.99 kg/m    Temp:  [98.2  F (36.8  C)-99.5  F (37.5  C)] 99  F (37.2  C)  Pulse:  [] 86  Resp:  [33-42] 36  BP: (131)/(57) 131/57  MAP:  [60 mmHg-87 mmHg] 71 mmHg  Arterial Line BP: " (109-128)/(33-67) 127/42  FiO2 (%):  [40 %-50 %] 40 %  SpO2:  [85 %-100 %] 100 %  Wt Readings from Last 2 Encounters:   01/02/22 102.8 kg (226 lb 10.1 oz)   12/30/21 101.1 kg (222 lb 14.2 oz)       Intake/Output Summary (Last 24 hours) at 1/1/2022 0823  Last data filed at 1/1/2022 0700  Gross per 24 hour   Intake 1539.58 ml   Output 2582 ml   Net -1042.42 ml       Physical exam:  General: In bed, in NAD  HEENT: PERRL, no scleral icterus or injection  CARDIAC: RRR, no m/r/g appreciated. Peripheral pulses dopplered  RESP: Mechanical ventilation; CTAB, no wheezes, rhonchi or crackles appreciated.  GI: soft, BS hypoactive  : Flores  EXTREMITIES: Trace LE edema, pulses 2+. Femoral access site w/o bleeding, dressing c/d/i.   SKIN: No acute lesions appreciated  NEURO: Intubated, not responding    Labs (Past three days):  CBC  Recent Labs   Lab 01/02/22  0400 01/01/22  1556 01/01/22 0348 12/31/21  1602   WBC 23.4* 22.6* 22.5* 20.9*   RBC 3.44* 3.49* 3.63* 3.98   HGB 10.3* 10.4* 10.7* 11.7   HCT 32.8* 32.9* 34.0* 36.1   MCV 95 94 94 91   MCH 29.9 29.8 29.5 29.4   MCHC 31.4* 31.6 31.5 32.4   RDW 14.3 14.3 14.0 13.7    182 186 191     BMP  Recent Labs   Lab 01/02/22  0400 01/01/22 2015 01/01/22  1606 01/01/22  1556 01/01/22  0359 01/01/22  0349 01/01/22  0018 12/31/21  2355 12/31/21 2022 12/31/21  1602 12/31/21  0750     --   --  143  --  140  --   --   --  142  --    POTASSIUM 4.2  --   --  4.7  --  4.4  --  4.4  --  3.2* 4.0   CHLORIDE 110*  --   --  111*  --  108  --   --   --  108  --    CO2 14*  --   --  18*  --  22  --   --   --  22  --    ANIONGAP 20*  --   --  14  --  10  --   --   --  12  --    * 98 107* 109*   < > 126*   < >  --    < > 206*  --    BUN 59*  --   --  50*  --  38*  --   --   --  36*  --    CR 1.21*  --   --  1.18*  --  1.01  --   --   --  1.05*  --    GFRESTIMATED 46*  --   --  47*  --  57*  --   --   --  54*  --    MIKE 8.9  --   --  8.4*  --  8.5  --   --   --  8.2*  --    MAG  3.3*  --   --   --   --  2.7*  --   --   --  1.7 1.8   PHOS 4.4  --   --   --   --  3.7  --   --   --  2.7  --     < > = values in this interval not displayed.     Troponins:     INR  Recent Labs   Lab 12/30/21  1416 12/30/21  0424 12/29/21  2219   INR 1.27* 1.21* 1.21*     Liver panel  Recent Labs   Lab 01/02/22  0400 01/01/22  1556 01/01/22  0349 12/31/21  1602   PROTTOTAL 6.8 6.6* 6.3* 6.6*   ALBUMIN 2.6* 2.6* 2.5* 2.7*   BILITOTAL 1.5* 1.5* 1.2 1.2   ALKPHOS 108 89 85 86   * 78* 68* 69*   ALT 30 27 27 31       Imaging/procedure results:  XR Chest Port 1 View  RESIDENT PRELIMINARY INTERPRETATION  IMPRESSION:   1. ET tube is 10.4 cm from the al.  2. Right IJ endocardial pacing lead with tip in the right ventricle.  3. Inferior approach PICC with tip in the intrahepatic IVC.  4. Improved lung volumes with stable perihilar and bibasilar  interstitial pulmonary opacities.  5. Stable small right pleural effusion.

## 2022-01-02 NOTE — PROGRESS NOTES
Neuroscience Intensive Care Progress Note    REASON FOR CRITICAL CARE ADMISSION:  Cardiac Arrest     Date of Service:  2022  Date of Admission:  2021  Hospital Day: 4    24 hour events: Present decline in EEG signal with lower amplitude findings.    24 Hour Vital Signs Summary:  Temp: 99  F (37.2  C) Temp  Min: 98.2  F (36.8  C)  Max: 99.5  F (37.5  C)  Resp: (!) 36 Resp  Min: 33  Max: 42  SpO2: 100 % SpO2  Min: 85 %  Max: 100 %  Pulse: 86 Pulse  Min: 84  Max: 115    No data recorded  BP: 131/57 Systolic (24hrs), Av , Min:131 , Max:131   Diastolic (24hrs), Av, Min:57, Max:57    Respiratory monitoring:   Ventilation Mode: CMV/AC  (Continuous Mandatory Ventilation/ Assist Control)  FiO2 (%): 40 %  Rate Set (breaths/minute): 16 breaths/min  Tidal Volume Set (mL): 500 mL  PEEP (cm H2O): 10 cmH2O  Oxygen Concentration (%): 40 %  Peak Inspiratory Pressure (cm H2O) (Drager Bette): 29  Resp: (!) 36    I/O last 3 completed shifts:  In: 966.38 [I.V.:816.38; NG/GT:150]  Out:  [Urine:1805; Emesis/NG output:200]    Current Medications:    ceFEPIme (MAXIPIME) IV  2 g Intravenous Q24H     heparin ANTICOAGULANT  5,000 Units Subcutaneous Q8H     ipratropium - albuterol 0.5 mg/2.5 mg/3 mL  3 mL Nebulization Q4H     multivitamins w/minerals  15 mL Per Feeding Tube Daily     pantoprazole  40 mg Oral or Feeding Tube Daily     polyethylene glycol  17 g Oral Daily     senna-docusate  1 tablet Oral At Bedtime       PRN Medications:  acetaminophen **OR** acetaminophen, dextrose, glucose **OR** dextrose **OR** glucagon, fentaNYL, naloxone **OR** naloxone **OR** naloxone **OR** naloxone, polyethylene glycol, senna-docusate **OR** senna-docusate    Infusions:    dextrose       niCARdipine Stopped (22 1815)       Allergies   Allergen Reactions     Lisinopril Other (See Comments)     Comment: cough, Description:      Losartan Other (See Comments)     Comment: dizziness, Description:      Latex Other (See Comments)  and Rash     Comment: Itching, Description:          Physical Examination:  General: Adult female patient, lying in bed, NAD  HEENT: Normocephalic/Atraumatic, no icterus  Cardiac: RRR  Chest: Intubated on mechanical ventilation  Abdomen: Tense  Extremities: Warm, no edema  Skin: No rash or lesion     Neuro:  Mental status: Pt does not wake to either verbal, light touch, or noxious stimuli. Does not open eyes spontaneously or to command. Does not track or follow any command  Cranial nerves: PERRL. Absent cough reflex, corneal reflex intact, VOR intact  Motor: Depressed tone throughout, no response to noxious stimuli in any extremity no movement seen  Sensory: Fails to respond to noxious stimuli in any extremity  Coordination: Deferred  Gait: Deferred    Labs/Studies:  BMP  All relevant imaging and laboratory values personally reviewed    Assessment/Plan  Barbara Nelson is a 76 year old female who presented on 12/30/2021 with cardiac arrest. The NCC service was consulted to evaluate for prognostication. While initial CTH showed mild grey-white differentiation loss, the pt's atypical burst suppression seen on EEG is quite concerning for severe hypoxic brain injury. This is reflected in the continuation of a poorly responsive neurologic exam despite cessation of sedating medications. Given this, the pt's neurologic and functional recovery is suspected to be rather limited and pt will likely require extensive assistance to perform even the most basic of functions. Will continue to follow     Recommendations:  - vEEG can likely be discontinued tomorrow given the decline in EEG pattern and lack of seizure to follow  - MRI Brain today if possible for further clarification of ischemic damage, if pacer wires able to be removed today  - Continue to hold all sedating medications  - Suspect likely poor prognosis given combination of decline in EEG pattern as well as loss of some cranial nerve findings    Bridger Ramirez  MD  Neurocritical Care  Vascular Neurology  Text Page - [phone]  Date of Service: 01/02/2022    Neurocritical care time:  I spent 42 minutes bedside and on the inpatient unit today managing the neurocritical care of Barbara Nelson in relation to the issues listed in this note independent of procedures performed this day.

## 2022-01-03 NOTE — PLAN OF CARE
PT 4E: Defer- Per chart review, patient will be transitioning to comfort cares. No PT needs identified, will complete orders.

## 2022-01-03 NOTE — PROGRESS NOTES
CLINICAL NUTRITION SERVICES    Informed decision made to change pt s status to comfort care. Nutrition interventions discontinued and no further interventions planned at this time. RD can be consulted if needed.    RD signing off on 1/3/2022.    Britney Wilkins RD, LD  Pager: [phone]

## 2022-01-03 NOTE — CONSULTS
Reviewed chart and spoke to RN regarding consult for preexisting tongue pressure injury. RN states the patient is going to comfort cares. Will discontinue consult under the current circumstances

## 2022-01-03 NOTE — SIGNIFICANT EVENT
SPIRITUAL HEALTH SERVICES Significant Event  [employer]ING  [facility] 4e    Pt anointed by Father Ray Jordan   Pager [phone]

## 2022-01-03 NOTE — PLAN OF CARE
No change in neuro status except has cough/gag reflex. Pupils slightly unequal but briskly reactive. Decorticate posturing with stimulation (suctioning). Weaned off Levo and hypertensive now with SBP 150s-160s but wide pulse pressure and MAPs are 70s and low 80s. Flipped into A-fib around midnight; HR 100s-120s. T-max 38.1  Weak pulses. Good urine output. OG to LIS and putting out dark bile; 250ml during the shift. Replacing potassium this am.    Plan: Comfort care.

## 2022-01-03 NOTE — DISCHARGE SUMMARY
[address] [address]  p: [phone]    Discharge Summary: Cardiology Service    Barbara Nelson MRN# [id number]   YOB: 1945 Age: 76 year old       Admission Date: 12/30/2021  Discharge Date: 01/03/22      Discharge Diagnoses:  # Hypoxic brain injury  # Cardiac arrest  # 3rd degree heart block  # Acute hypoxic respiratory failure  # Anemia  # Leukocytosis    Brief HPI:  Barbara Nelson is a 76 year old female with PMHx of GOLD stage II COPD, hx of non-small cell lung cancer s/p right lobectomy in 2006, stable lung nodule, hypertension, anxiety & peripheral neuropathy who presents as a transfer from the [facility] ED to the [facility] CICU on 12/30/2021 s/p cardiac arrest.     The patient was reportedly watching TV when she complained of sudden onset shortness of breath. She started a nebulizer treatment- however half way through collapsed (witnessed by ). He administered breaths (but no compressions). EMS arrived on the scene approximately 15 minutes s/p arrest and found the patient to be pulseless and agonal breathing. Initial rhythm was non-shockable. CPR started, 3 rounds of epinephrine given and ROSC achieved after approximately 10-12 mins of ACLS. Patient initiated on an epinephrine drip, given 50 mEq bicarb and intubated on the field. On arrival to the [facility] ED, she was noted to be bradycardic and patinet was then transferred to [facility] for further care. Upon arrival to [facility] patient remained bradycardic for which EP consulted and temp TVP placed for CHB. A thermogard cooling catheter was placed and cooling was initiated to 34C. Warm since 12/31. Patients course was c/b possibly hypoxic brain injury with slow recovery. On 1/2, family requested care conference and informed our team that ongoing care is against patients wishes and advance directive. To honor her wishes, decision was made by family to transition to  comfort care. After family saw patient, patient was palliatively extubated. She passed away on 1/3 at 1626.     Condition on discharge   See death note  Seen and discussed with Gustavo Barry, CVICU attending  CRISTELA Mann CNP  [facility]  Interventional Cardiology-CSI Service  Pager [phone]     Patient Care Team:  Ze Galvan MD as PCP [facility], MD as MD (Surgery)  Lyly Snyder, RN as Registered Nurse

## 2022-01-03 NOTE — PROGRESS NOTES
Cardiology ICU Progress Note    Brief HPI: Barbara Nelson is a 76 year old female with PMHx of GOLD stage II COPD, hx of non-small cell lung cancer s/p right lobectomy in 2006, stable lung nodule, hypertension, anxiety & peripheral neuropathy who presents as a transfer from the [facility] ED to the [facility] CICU on 12/30/2021 s/p cardiac arrest.     The patient was reportedly watching TV when she complained of sudden onset shortness of breath. She started a nebulizer treatment- however half way through collapsed (witnessed by ). He administered breaths (but no compressions). EMS arrived on the scene approximately 15 minutes s/p arrest and found the patient to be pulseless and agonal breathing. Initial rhythm was non-shockable. CPR started, 3 rounds of epinephrine given and ROSC achieved after approximately 10-12 mins of ACLS. Patient initiated on an epinephrine drip, given 50 mEq bicarb and intubated on the field. On arrival to the [facility] ED, she was noted to be bradycardic and patinet was then transferred to [facility] for further care. Upon arrival to [facility] patient remained bradycardic for which EP consulted and temp TVP placed for CHB. A thermogard cooling catheter was placed and cooling was initiated to 34C. Warm since 12/31.     Subjective and Interval: Remains intubated. No overnight events. Yesterday, Care conference with family including 2 daughters and . After reviewing patient's advanced directive and discussion with their family, they opted to transition to comfort care as patient did not want any life support. Family has opted to visit today, and will palliatively extubate this afternoon.     Assessment and plan by system:   Today's changes:  Comfort care orders placed  Discontinued labs and medications that don't contribute to comfort     Neurology   # Concern for hypoxic brain injury  # Seizure-like activity   Intubated and sedated. Cooled to 34 degrees. Warm since 12/31 am. Did have  apparent seizure like activity on arrival which improved with benzodiazepines. No seizures thus far on EEG , consistent now with burst suppression where the suppressions are becoming shorter with longer bursts. Bursts stopped eventually with little cerebral electrical activity.   Head CT with questionable small hypodense foci scattered throughout the bilateral basal ganglia, thalami and right cerebellum concerning for acute infarctions. Cancel MRI given families wishes.    Sedation:  Comfort care orders placed. Prior to, off sedation several days    Cardiovascular  # PEA cardiac arrest   # HTN  # 3rd degree AV block s/p temp TVP via RIJ  CHB on arrival, EP consulted and temp TVP placed. Echo with normal biventricular function, EF of 55-60%, abnormal septal motion, (has pacer) normal RV, no effusion    Vasopressors/antiarrhythmics/inotropes:  None currently  Transition to comfort care     Pulmonary  # Acute hypoxic respiratory failure  # Pulmonary edema   # Pleural effusions  # Possible aspiration pneumonia  Vent settings: CMV 16/500/10/40. ABG 7.3/29/147/29  Cancel CXRs  Palliative extubation when family is ready     Gastrointestinal, Nutrition  # Mild ileus  discontinue Protonix  No nutrition per family     Renal, Electrolytes  # KAYLYN  # Lactic acidosis  # Hypoalbuminemia  Focus on comfort.      Infectious Disease  # Aspiration pneumonia  # Leukocytosis  WBC down. Tmax 101.8  Tylenol for comfort.   discontinue antibiotics    Hematology  # Anemia  HGB 10.3 (10.3)  (163)   - No further transfusions  - DVT prophylaxis: discontinue heparin subcutaneous      Endocrinology  # Hyperglycemia  No further insulin     MSK/Skin  No active issues.     Pertinent Lines  L femoral Thermogard December 30, 2021  L radial arterial line December 30, 2021  ETT December 29, 2021  Flores catheter December 30, 2021  OG tube December 30, 2021    Patient seen and discussed with staff physician.    CRISTELA Mann, [facility]  "of [facility]  Interventional Cardiology-CSI Service  Pager [phone]     Objective:  Most recent vital signs:  /57   Pulse 106   Temp 100.4  F (38  C)   Resp 25   Ht 1.765 m (5' 9.5\")   Wt 100.2 kg (220 lb 14.4 oz)   SpO2 100%   BMI 32.15 kg/m    Temp:  [98.4  F (36.9  C)-101.8  F (38.8  C)] 100.4  F (38  C)  Pulse:  [] 106  Resp:  [24-28] 25  MAP:  [58 mmHg-86 mmHg] 82 mmHg  Arterial Line BP: (109-163)/(34-54) 163/48  FiO2 (%):  [30 %-40 %] 30 %  SpO2:  [100 %] 100 %  Wt Readings from Last 2 Encounters:   01/03/22 100.2 kg (220 lb 14.4 oz)   12/30/21 101.1 kg (222 lb 14.2 oz)         Intake/Output Summary (Last 24 hours) at 1/3/2022 1510  Last data filed at 1/3/2022 1400  Gross per 24 hour   Intake 476.41 ml   Output 2770 ml   Net -2293.59 ml     Physical exam:  General: In bed, in NAD  HEENT: PERRL, no scleral icterus or injection  CARDIAC: RRR, no m/r/g appreciated. Peripheral pulses dopplered  RESP: Mechanical ventilation; CTAB, no wheezes, rhonchi or crackles appreciated.  GI: soft, BS hypoactive  : Flores  EXTREMITIES: Trace LE edema, pulses 2+. Femoral access site w/o bleeding, dressing c/d/i.   SKIN: No acute lesions appreciated  NEURO: Intubated, not responding    Labs (Past three days):  CBC  Recent Labs   Lab 01/03/22  0411 01/02/22  1556 01/02/22  0400 01/01/22  1556   WBC 15.2* 19.6* 23.4* 22.6*   RBC 3.45* 3.31* 3.44* 3.49*   HGB 10.3* 9.8* 10.3* 10.4*   HCT 32.5* 31.2* 32.8* 32.9*   MCV 94 94 95 94   MCH 29.9 29.6 29.9 29.8   MCHC 31.7 31.4* 31.4* 31.6   RDW 14.5 14.4 14.3 14.3   * 168 168 182     BMP  Recent Labs   Lab 01/03/22  0411 01/02/22  1603 01/02/22  1556 01/02/22  0803 01/02/22  0400 01/01/22  1606 01/01/22  1556 01/01/22  0359 01/01/22  0349 12/31/21 2022 12/31/21  1602   *  --  148*  --  144  --  143  --  140  --  142   POTASSIUM 3.4  --  3.4  --  4.2  --  4.7  --  4.4   < > 3.2*   CHLORIDE 118*  --  114*  --  110*  --  111*  --  108  " --  108   CO2 21  --  15*  --  14*  --  18*  --  22  --  22   ANIONGAP 12  --  19*  --  20*  --  14  --  10  --  12   * 118* 117* 86 101*   < > 109*   < > 126*   < > 206*   BUN 54*  --  64*  --  59*  --  50*  --  38*  --  36*   CR 1.06*  --  1.24*  --  1.21*  --  1.18*  --  1.01  --  1.05*   GFRESTIMATED 54*  --  44*  --  46*  --  47*  --  57*  --  54*   MIKE 9.5  --  9.0  --  8.9  --  8.4*  --  8.5  --  8.2*   MAG 3.4*  --   --   --  3.3*  --   --   --  2.7*  --  1.7   PHOS 3.3  --   --   --  4.4  --   --   --  3.7  --  2.7    < > = values in this interval not displayed.     Troponins:     INR  Recent Labs   Lab 12/30/21  1416 12/30/21  0424 12/29/21  2219   INR 1.27* 1.21* 1.21*     Liver panel  Recent Labs   Lab 01/03/22  0411 01/02/22  1556 01/02/22  0400 01/01/22  1556   PROTTOTAL 6.7* 6.6* 6.8 6.6*   ALBUMIN 2.3* 2.4* 2.6* 2.6*   BILITOTAL 1.1 1.5* 1.5* 1.5*   ALKPHOS 94 97 108 89   * 181* 106* 78*   ALT 36 35 30 27       Imaging/procedure results:  XR Chest Port 1 View  Narrative: EXAM: XR CHEST PORT 1 VIEW  1/3/2022 1:13 AM     HISTORY:  ETT, line placement       COMPARISON:  Chest radiograph 1/22/2022    FINDINGS:   AP supine view of the chest. Endotracheal tube tip projects in the  high thoracic trachea approximately 8.2 cm above the al. Right IJ  central venous catheter tip projects in the high SVC. Enteric tube  courses below the diaphragm with tip outside the field of view.  Inferior approach catheter tip terminates in the intrahepatic IVC.    Midline trachea. Cardiomediastinal silhouette is within normal limits.  Mild bibasilar streaky opacities are unchanged. No new focal pulmonary  opacity. Visualized upper abdomen is unremarkable.  Impression: IMPRESSION:   Endotracheal tube tip projects in the high thoracic trachea  approximately 8.2 cm above the al.    I have personally reviewed the examination and initial interpretation  and I agree with the findings.    STEPHEN FAGAN MD          SYSTEM ID:  [id number]

## 2022-01-03 NOTE — DEATH PRONOUNCEMENT
DEATH PRONOUNCEMENT    Called to pronounce Barbara Nelson dead.    Physical Exam: Unresponsive to noxious stimuli, Spontaneous respirations absent, Breath sounds absent, Carotid pulse absent, Heart sounds absent, Pupillary light reflex absent and Corneal blink reflex absent    Patient was pronounced dead at 1626 PM, January 3, 2022.    Preliminary Cause of Death: Cardiac arrest    Active Problems:    Cardiac arrest (H)       Infectious disease present?: NO    Communicable disease present? (examples: HIV, chicken pox, TB, Ebola, CJD) :  NO    Multi-drug resistant organism present? (example: MRSA): NO    Please consider an autopsy if any of the following exist:  NO Unexpected or unexplained death during or following any dental, medical, or surgical diagnostic treatment procedures.   NO Death of mother at or up to seven days after delivery.     NO All  and pediatric deaths.     NO Death where the cause is sufficiently obscure to delay completion of the death certificate.   NO Deaths in which autopsy would confirm a suspected illness/condition that would affect surviving family members or recipients of transplanted organs.     The following deaths must be reported to the 's Office:  NO A death that may be due entirely or in part to any factors other than natural disease (recent surgery, recent trauma, suspected abuse/neglect).   NO A death that may be an accident, suicide, or homicide.     NO Any sudden, unexpected death in which there is no prior history of significant heart disease or any other condition associated with sudden death.   NO A death under suspicious, unusual, or unexpected circumstances.    NO Any death which is apparently due to natural causes but in which the  does not have a personal physician familiar with the patient s medical history, social, or environmental situation or the circumstances of the terminal event.   NO Any death apparently due to Sudden Infant Death  Syndrome.     NO Deaths that occur during, in association with, or as consequences of a diagnostic, therapeutic, or anesthetic procedure.   NO Any death in which a fracture of a major bone has occurred within the past (6) six months.   NO A death of persons note seen by their physician within 120 days of demise.     NO Any death in which the  was an inmate of a public institution or was in the custody of Law Enforcement personnel.   NO  All unexpected deaths of children   NO Solid organ donors   NO Unidentified bodies   NO Deaths of persons whose bodies are to be cremated or otherwise disposed of so that the bodies will later be unavailable for examination;   NO Deaths unattended by a physician outside of a licensed healthcare facility or licensed residential hospice program   NO Deaths occurring within 24 hours of arrival to a health care facility if death is unexpected.    NO Deaths associated with the decedent s employment.   NO Deaths attributed to acts of terrorism.   NO Any death in which there is uncertainty as to whether it is a medical examiner s care should be discussed with the medical investigator.        Body disposition: Autopsy was discussed with family member:  Spouse by phone.  Permission for autopsy was declined.

## 2022-01-03 NOTE — PLAN OF CARE
Final family members at bedside this AM. Comfort care orders carried out. Plan per  Michael, to extubate patient at 1600. Will notify family and life source with cardiac time of death.     Extubated 1615. Cardiac time of death 1626. Family notified. [facility] notified. Life source notified.

## 2022-01-04 LAB
BACTERIA BLD CULT: NO GROWTH
BACTERIA BLD CULT: NO GROWTH
